# Patient Record
Sex: FEMALE | Race: WHITE | Employment: FULL TIME | URBAN - METROPOLITAN AREA
[De-identification: names, ages, dates, MRNs, and addresses within clinical notes are randomized per-mention and may not be internally consistent; named-entity substitution may affect disease eponyms.]

---

## 2017-04-17 ENCOUNTER — OFFICE VISIT (OUTPATIENT)
Dept: FAMILY MEDICINE | Facility: CLINIC | Age: 37
End: 2017-04-17
Payer: COMMERCIAL

## 2017-04-17 VITALS
WEIGHT: 200 LBS | HEIGHT: 67 IN | OXYGEN SATURATION: 99 % | SYSTOLIC BLOOD PRESSURE: 101 MMHG | DIASTOLIC BLOOD PRESSURE: 67 MMHG | BODY MASS INDEX: 31.39 KG/M2 | HEART RATE: 69 BPM

## 2017-04-17 DIAGNOSIS — B35.1 DERMATOPHYTOSIS OF NAIL: ICD-10-CM

## 2017-04-17 DIAGNOSIS — E03.4 HYPOTHYROIDISM DUE TO ACQUIRED ATROPHY OF THYROID: ICD-10-CM

## 2017-04-17 DIAGNOSIS — Z00.00 ROUTINE GENERAL MEDICAL EXAMINATION AT A HEALTH CARE FACILITY: Primary | ICD-10-CM

## 2017-04-17 LAB — TSH SERPL DL<=0.05 MIU/L-ACNC: 3.79 MU/L (ref 0.4–4)

## 2017-04-17 PROCEDURE — 87101 SKIN FUNGI CULTURE: CPT | Performed by: INTERNAL MEDICINE

## 2017-04-17 PROCEDURE — 84443 ASSAY THYROID STIM HORMONE: CPT | Performed by: INTERNAL MEDICINE

## 2017-04-17 PROCEDURE — 99385 PREV VISIT NEW AGE 18-39: CPT | Performed by: INTERNAL MEDICINE

## 2017-04-17 PROCEDURE — 36415 COLL VENOUS BLD VENIPUNCTURE: CPT | Performed by: INTERNAL MEDICINE

## 2017-04-17 NOTE — PROGRESS NOTES
SUBJECTIVE:     CC: Suzie Castillo is an 36 year old woman who presents for preventive health visit.     Patient with a history of hypothyroidism presents to the clinic to to establish care. She states that she discontinued levothyroxine and has not been taking it for a year and a half and she has notice that her headaches have returned. She also states she has associated weight gain and fatigue. She denies any changes in her voice or other symptoms of her thyroid issue.    Patient also has been treated for a toe fungus. She states that she took medication for it for 4 months in the past, but she thinks the infection has returned.    Medications:  Minocycline HCL-acne  Spironolactone  Cetirizine--Allergies  Fluticasone--Allergies      Healthy Habits:    Do you get at least three servings of calcium containing foods daily (dairy, green leafy vegetables, etc.)? yes    Amount of exercise or daily activities, outside of work: 5 day(s) per week    Problems taking medications regularly No    Medication side effects: No    Have you had an eye exam in the past two years? no    Do you see a dentist twice per year? yes    Do you have sleep apnea, excessive snoring or daytime drowsiness?no    Today's PHQ-2 Score: No flowsheet data found.    Abuse: Current or Past(Physical, Sexual or Emotional)- No  Do you feel safe in your environment - Yes    Social History   Substance Use Topics     Smoking status: Not on file     Smokeless tobacco: Not on file     Alcohol use Not on file     The patient does not drink >3 drinks per day nor >7 drinks per week.    No results for input(s): CHOL, HDL, LDL, TRIG, CHOLHDLRATIO, NHDL in the last 06761 hours.    Reviewed orders with patient.  Reviewed health maintenance and updated orders accordingly - Yes    Mammo Decision Support:  **    Pertinent mammograms are reviewed under the imaging tab.  History of abnormal Pap smear:   Reviewed and updated as needed this visit by clinical  "staff    ROS:  C: NEGATIVE for fever, chills, POSITIVE for change in weight, fatigue  I: NEGATIVE for worrisome rashes, moles or lesions, POSITIVE toe nail discoloration  E: NEGATIVE for vision changes or irritation  HENT: NEGATIVE for ear, mouth and throat , POSITIVE for headaches  R: NEGATIVE for significant cough or SOB  B: NEGATIVE for masses, tenderness or discharge  CV: NEGATIVE for chest pain, palpitations or peripheral edema  GI: NEGATIVE for nausea, abdominal pain, heartburn, or change in bowel habits  : NEGATIVE for unusual urinary or vaginal symptoms. No vaginal bleeding.  M: NEGATIVE for significant arthralgias or myalgia  N: NEGATIVE for weakness, dizziness or paresthesias  P: NEGATIVE for changes in mood or affect     Current Outpatient Prescriptions   Medication Sig Dispense Refill     Minocycline HCl (MINOCIN PO) Take by mouth 2 times daily       SPIRONOLACTONE PO Take by mouth 2 times daily       No Known Allergies     This document serves as a record of the services and decisions personally performed and made by Yusuf Doshi MD. It was created on his/her behalf by Hemal Randall, a trained medical scribe. The creation of this document is based the provider's statements to the medical scribe.  Lance Randall 8:28 AM, April 17, 2017    OBJECTIVE:     /67  Pulse 69  Ht 1.702 m (5' 7\")  Wt 90.7 kg (200 lb)  SpO2 99%  BMI 31.32 kg/m2  EXAM:    Neck was supple without adenopathy or thyromegaly or tenderness or nodularity,  her carotids were normal without bruits  Chest clear to auscultation and percussion  Cardiovascular S1 and S2 are physiologic without murmurs or gallops  Abdomen bowel sounds were normal.  There is no palpable mass or organomegaly  Extremities nontender without any edema, she onychomycosis changes of both her great nails and possibly her right third nail, samples of her nail were submitted  Skin without significant abnormality  Neuro: reflexes were zero to " "1+ bilaterally symmetrical    ASSESSMENT/PLAN:     1. Routine general medical examination at a health care facility    2. Hypothyroidism due to acquired atrophy of thyroid  - TSH    3. Dermatophytosis of nail  - Fungus Culture,  skin, hair, or nail    -Call patient with results of lab results and toe nail culture. Also check for Therapist referrals.    COUNSELING:   Reviewed preventive health counseling, as reflected in patient instructions       Regular exercise       Healthy diet/nutrition     reports that she has never smoked. She does not have any smokeless tobacco history on file.    Estimated body mass index is 31.32 kg/(m^2) as calculated from the following:    Height as of this encounter: 1.702 m (5' 7\").    Weight as of this encounter: 90.7 kg (200 lb).   Weight management plan: Discussed healthy diet and exercise guidelines and patient will follow up in 3 months in clinic to re-evaluate.    Counseling Resources:  ATP IV Guidelines  Pooled Cohorts Equation Calculator  Breast Cancer Risk Calculator  FRAX Risk Assessment  ICSI Preventive Guidelines  Dietary Guidelines for Americans, 2010  USDA's MyPlate  ASA Prophylaxis  Lung CA Screening    The information in this document, created by the medical scribe for me, accurately reflects the services I personally performed and the decisions made by me. I have reviewed and approved this document for accuracy prior to leaving the patient care area.  Yusuf Doshi MD  8:13 AM, 04/17/17    Yusuf Doshi MD  [facility]  "

## 2017-04-17 NOTE — MR AVS SNAPSHOT
After Visit Summary   4/17/2017    Suzie Castillo    MRN: [id number]           Patient Information     Date Of Birth          1980        Visit Information        Provider Department      4/17/2017 7:30 AM Yusuf Doshi MD [facility]        Today's Diagnoses     Routine general medical examination at a health care facility    -  1    Hypothyroidism due to acquired atrophy of thyroid        Dermatophytosis of nail          Care Instructions      Preventive Health Recommendations  Female Ages 26 - 39  Yearly exam:   See your health care provider every year in order to    Review health changes.     Discuss preventive care.      Review your medicines if you your doctor has prescribed any.    Until age 30: Get a Pap test every three years (more often if you have had an abnormal result).    After age 30: Talk to your doctor about whether you should have a Pap test every 3 years or have a Pap test with HPV screening every 5 years.   You do not need a Pap test if your uterus was removed (hysterectomy) and you have not had cancer.  You should be tested each year for STDs (sexually transmitted diseases), if you're at risk.   Talk to your provider about how often to have your cholesterol checked.  If you are at risk for diabetes, you should have a diabetes test (fasting glucose).  Shots: Get a flu shot each year. Get a tetanus shot every 10 years.   Nutrition:     Eat at least 5 servings of fruits and vegetables each day.    Eat whole-grain bread, whole-wheat pasta and brown rice instead of white grains and rice.    Talk to your provider about Calcium and Vitamin D.     Lifestyle    Exercise at least 150 minutes a week (30 minutes a day, 5 days of the week). This will help you control your weight and prevent disease.    Limit alcohol to one drink per day.    No smoking.     Wear sunscreen to prevent skin cancer.    See your dentist every six months for an exam and cleaning.           "Follow-ups after your visit        Who to contact     If you have questions or need follow up information about today's clinic visit or your schedule please contact [facility] directly at [phone].  Normal or non-critical lab and imaging results will be communicated to you by MyChart, letter or phone within 4 business days after the clinic has received the results. If you do not hear from us within 7 days, please contact the clinic through MyChart or phone. If you have a critical or abnormal lab result, we will notify you by phone as soon as possible.  Submit refill requests through MaxCDN or call your pharmacy and they will forward the refill request to us. Please allow 3 business days for your refill to be completed.          Additional Information About Your Visit        Shopping Buddy[employer]InSp[employer] Information     MaxCDN lets you send messages to your doctor, view your test results, renew your prescriptions, schedule appointments and more. To sign up, go to [email or web address] . Click on \"Log in\" on the left side of the screen, which will take you to the Welcome page. Then click on \"Sign up Now\" on the right side of the page.     You will be asked to enter the access code listed below, as well as some personal information. Please follow the directions to create your username and password.     Your access code is: [email or web address]  Expires: 2017  4:36 AM     Your access code will  in 90 days. If you need help or a new code, please call your Center clinic or [phone].        Care EveryWhere ID     This is your Care EveryWhere ID. This could be used by other organizations to access your Center medical records  [id number]        Your Vitals Were     Pulse Height Pulse Oximetry BMI (Body Mass Index)          69 5' 7\" (1.702 m) 99% 31.32 kg/m2         Blood Pressure from Last 3 Encounters:   17 101/67    Weight from Last 3 Encounters:   17 200 lb (90.7 kg)              We Performed the " Following     Fungus Culture,  skin, hair, or nail     TSH        Primary Care Provider    None Specified       No primary provider on file.        Thank you!     Thank you for choosing [facility]  for your care. Our goal is always to provide you with excellent care. Hearing back from our patients is one way we can continue to improve our services. Please take a few minutes to complete the written survey that you may receive in the mail after your visit with us. Thank you!             Your Updated Medication List - Protect others around you: Learn how to safely use, store and throw away your medicines at www.disposemymeds.org.          This list is accurate as of: 4/17/17 11:59 PM.  Always use your most recent med list.                   Brand Name Dispense Instructions for use    MINOCIN PO      Take by mouth 2 times daily       SPIRONOLACTONE PO      Take by mouth 2 times daily

## 2017-04-25 ENCOUNTER — TELEPHONE (OUTPATIENT)
Dept: FAMILY MEDICINE | Facility: CLINIC | Age: 37
End: 2017-04-25

## 2017-04-25 NOTE — TELEPHONE ENCOUNTER
Reason for Call:  Request for results:    Name of test or procedure: Labs    Date of test of procedure: 4/17/2016    Location of the test or procedure: [facility]    OK to leave the result message on voice mail or with a family member? NO    Phone number Patient can be reached at:  Home number on file [phone] (home)    Additional comments: Please mail to her Home address results were read    Call taken on 4/25/2017 at 11:06 AM by Anel Rg

## 2017-04-25 NOTE — TELEPHONE ENCOUNTER
Reason for Call:  Other Referral    Detailed comments: The patient wants a Referral for a Family Therapist  Delores Gudino    Phone Number Patient can be reached at: Home number on file [phone] (home)    Best Time: anytime    Can we leave a detailed message on this number? YES    Call taken on 4/25/2017 at 11:17 AM by Anel Rg

## 2017-05-11 ENCOUNTER — TELEPHONE (OUTPATIENT)
Dept: FAMILY MEDICINE | Facility: CLINIC | Age: 37
End: 2017-05-11

## 2017-05-11 NOTE — TELEPHONE ENCOUNTER
Reason for Call:  Other results    Detailed comments: patient would like to get lab results from April, and also said she was to get referral for therapist so she wants to get that info to call and schedule.     Phone Number Patient can be reached at: Cell number on file:    Telephone Information:   Mobile [phone]       Best Time: any    Can we leave a detailed message on this number? YES    Call taken on 5/11/2017 at 12:45 PM by Tere Olvera

## 2017-05-15 LAB
BACTERIA SPEC CULT: NORMAL
MICRO REPORT STATUS: NORMAL
SPECIMEN SOURCE: NORMAL

## 2017-05-23 ENCOUNTER — OFFICE VISIT (OUTPATIENT)
Dept: FAMILY MEDICINE | Facility: CLINIC | Age: 37
End: 2017-05-23
Payer: COMMERCIAL

## 2017-05-23 VITALS
WEIGHT: 201 LBS | DIASTOLIC BLOOD PRESSURE: 93 MMHG | HEIGHT: 67 IN | TEMPERATURE: 97.6 F | SYSTOLIC BLOOD PRESSURE: 139 MMHG | BODY MASS INDEX: 31.55 KG/M2 | HEART RATE: 83 BPM | OXYGEN SATURATION: 100 %

## 2017-05-23 DIAGNOSIS — F41.8 DEPRESSION WITH ANXIETY: Primary | ICD-10-CM

## 2017-05-23 PROCEDURE — 99214 OFFICE O/P EST MOD 30 MIN: CPT | Performed by: NURSE PRACTITIONER

## 2017-05-23 RX ORDER — CITALOPRAM HYDROBROMIDE 20 MG/1
TABLET ORAL
Qty: 30 TABLET | Refills: 0 | Status: SHIPPED | OUTPATIENT
Start: 2017-05-23 | End: 2017-06-13

## 2017-05-23 RX ORDER — LORAZEPAM 0.5 MG/1
.5-1 TABLET ORAL 2 TIMES DAILY PRN
Qty: 20 TABLET | Refills: 0 | Status: SHIPPED | OUTPATIENT
Start: 2017-05-23 | End: 2017-06-16

## 2017-05-23 ASSESSMENT — ANXIETY QUESTIONNAIRES
3. WORRYING TOO MUCH ABOUT DIFFERENT THINGS: NEARLY EVERY DAY
7. FEELING AFRAID AS IF SOMETHING AWFUL MIGHT HAPPEN: MORE THAN HALF THE DAYS
1. FEELING NERVOUS, ANXIOUS, OR ON EDGE: NEARLY EVERY DAY
GAD7 TOTAL SCORE: 18
6. BECOMING EASILY ANNOYED OR IRRITABLE: MORE THAN HALF THE DAYS
5. BEING SO RESTLESS THAT IT IS HARD TO SIT STILL: NEARLY EVERY DAY
2. NOT BEING ABLE TO STOP OR CONTROL WORRYING: MORE THAN HALF THE DAYS
IF YOU CHECKED OFF ANY PROBLEMS ON THIS QUESTIONNAIRE, HOW DIFFICULT HAVE THESE PROBLEMS MADE IT FOR YOU TO DO YOUR WORK, TAKE CARE OF THINGS AT HOME, OR GET ALONG WITH OTHER PEOPLE: SOMEWHAT DIFFICULT

## 2017-05-23 ASSESSMENT — PATIENT HEALTH QUESTIONNAIRE - PHQ9: 5. POOR APPETITE OR OVEREATING: NEARLY EVERY DAY

## 2017-05-23 NOTE — PATIENT INSTRUCTIONS
We will start a daily antidepressant pill, citalopram. We will start with half of a pill for 1-2 weeks, then you can increase to a full pill. Increase when you feel that you do not have any side effects (an example is nausea).   I also gave you lorazepam, which is a rescue antianxiety medication. Start by taking 1 pill. If it is too strong, you can cut the pill in half. If you are having a really bad anxiety attack, you can take 2.   Please follow up with Dr Doshi in 3-4 weeks to make sure you are doing well

## 2017-05-23 NOTE — TELEPHONE ENCOUNTER
"PCP:    Spoke with the Pt. The Pt reports that she has been having a \"consistant panic attack for the past 2 months.\"  Pt states that she was at work today, and started to have uncontrollable anxiety - crying fits, shaking and feeling panicky.   Pt is currently not on any psychiatric medications. Denies any psychiatric hx. States she just recently broke up with her fiance. This has been a major stressor in her life.   Pt is currently in her car 5 minutes from the clinic. Pt denies any thoughts of harming herself or others.   I did schedule the Pt with TEAM at 4:30pm, Pt states she can make it here in time for this appointment.     Aria Cornell RN             "

## 2017-05-23 NOTE — TELEPHONE ENCOUNTER
Reason for Call: Following up on results     Detailed comments: Suzie Castillo is calling because she has been waiting for a call since May 11, 2017 and no one has answered her. Please give her a call back immediately.     Phone Number Patient can be reached at: Home number on file [phone] (home)    Best Time: ASAP     Can we leave a detailed message on this number? YES    Call taken on 5/23/2017 at 4:14 PM by Audelia Silva

## 2017-05-23 NOTE — NURSING NOTE
"Chief Complaint   Patient presents with     Anxiety       Initial BP (!) 139/93 (BP Location: Left arm, Patient Position: Chair, Cuff Size: Adult Regular)  Pulse 83  Temp 97.6  F (36.4  C)  Ht 5' 7\" (1.702 m)  Wt 201 lb (91.2 kg)  SpO2 100%  Breastfeeding? No  BMI 31.48 kg/m2 Estimated body mass index is 31.48 kg/(m^2) as calculated from the following:    Height as of this encounter: 5' 7\" (1.702 m).    Weight as of this encounter: 201 lb (91.2 kg).  Medication Reconciliation: incomplete   Having active anxiety attack  "

## 2017-05-23 NOTE — PROGRESS NOTES
"HPI    New anxiety symptoms for about 5 weeks when she broke up with fiance. She just found out he got  last weekend which made all her symptoms worse.  Was on an antidepressant 10 years ago   Started waking in the middle of the night with tears nearly every night   Using lavender and chamomile   Poor appetite but still eating   Poor concentration   Also irritable   Mild palpitations but more shaking   No SI/HI   Works as a    Lives alone in an apartment   Has a good support system       No past medical history on file.  No past surgical history on file.  Social History   Substance Use Topics     Smoking status: Never Smoker     Smokeless tobacco: Never Used     Alcohol use Yes      Comment: rare social      Current Outpatient Prescriptions   Medication Sig Dispense Refill     citalopram (CELEXA) 20 MG tablet Take 1/2 tablet (10 mg) for 1-2 weeks, then increase to 1 tablet orally daily 30 tablet 0     LORazepam (ATIVAN) 0.5 MG tablet Take 1-2 tablets (0.5-1 mg) by mouth 2 times daily as needed for anxiety 20 tablet 0     Minocycline HCl (MINOCIN PO) Take by mouth 2 times daily       SPIRONOLACTONE PO Take by mouth 2 times daily       No Known Allergies    Reviewed PMH, med list and allergies.        ROS  Detailed as above       BP (!) 139/93 (BP Location: Left arm, Patient Position: Chair, Cuff Size: Adult Regular)  Pulse 83  Temp 97.6  F (36.4  C)  Ht 5' 7\" (1.702 m)  Wt 201 lb (91.2 kg)  SpO2 100%  BMI 31.48 kg/m2      Physical Exam   Constitutional: She is well-developed, well-nourished, and in no distress.   HENT:   Head: Normocephalic.   Eyes: Conjunctivae are normal.   Pulmonary/Chest: Effort normal.   Neurological: She is alert.   Psychiatric: Affect normal. Her mood appears anxious.   Crying, shaking, anxious   Vitals reviewed.      Assessment and Plan:       ICD-10-CM    1. Depression with anxiety F41.8 citalopram (CELEXA) 20 MG tablet     LORazepam (ATIVAN) 0.5 MG tablet "       I would recommend counseling at her next visit. This was mentioned, but she was in such crisis, that we didn't discuss much. We will start with treatment per below and close f/u.     Patient Instructions   We will start a daily antidepressant pill, citalopram. We will start with half of a pill for 1-2 weeks, then you can increase to a full pill. Increase when you feel that you do not have any side effects (an example is nausea).   I also gave you lorazepam, which is a rescue antianxiety medication. Start by taking 1 pill. If it is too strong, you can cut the pill in half. If you are having a really bad anxiety attack, you can take 2.   Please follow up with Dr Doshi in 3-4 weeks to make sure you are doing well         The total visit time was 20 minutes more  than 50% was spent in counseling and coordination of care as discussed above.      Hazel Lunsford, GENE, CNP  [facility]

## 2017-05-23 NOTE — PROGRESS NOTES
"HPI      SUBJECTIVE:                                                    Suzie Castillo is a 36 year old female who presents to clinic today for the following health issues:      Abnormal Mood Symptoms      Duration: ***    Description:  Depression: { :203143::\"no\"}  Anxiety: { :407702::\"no\"}  Panic attacks: { :853106::\"no\"}     Accompanying signs and symptoms: see PHQ-9 and TWYLA scores    History (similar episodes/previous evaluation): {.:101658::\"None\"}    Precipitating or alleviating factors: {.:390911::\"None\"}    Therapies tried and outcome: {.:292440::\"none\"}       {additional problems for provider to add:677728}    Problem list and histories reviewed & adjusted, as indicated.  Additional history: {NONE - AS DOCUMENTED:465153::\"as documented\"}    {HIST REVIEW/ LINKS 2:086324}    Reviewed and updated as needed this visit by clinical staff       Reviewed and updated as needed this visit by Provider         {PROVIDER CHARTING PREFERENCE:918115}      ROS      Physical Exam      "

## 2017-05-23 NOTE — MR AVS SNAPSHOT
After Visit Summary   5/23/2017    Suzie Castillo    MRN: [id number]           Patient Information     Date Of Birth          1980        Visit Information        Provider Department      5/23/2017 4:30 PM Hazel Lunsford APRN CNP [facility]        Today's Diagnoses     Depression with anxiety    -  1      Care Instructions    We will start a daily antidepressant pill, citalopram. We will start with half of a pill for 1-2 weeks, then you can increase to a full pill. Increase when you feel that you do not have any side effects (an example is nausea).   I also gave you lorazepam, which is a rescue antianxiety medication. Start by taking 1 pill. If it is too strong, you can cut the pill in half. If you are having a really bad anxiety attack, you can take 2.   Please follow up with Dr Doshi in 3-4 weeks to make sure you are doing well           Follow-ups after your visit        Your next 10 appointments already scheduled     Jun 19, 2017 11:00 AM CDT   New Visit with Katharina Reyez, [facility] [place] ([facility])    [address]  [facility] [address] [phone]            Jun 29, 2017  3:00 PM CDT   Return Visit with Katharina Reyez [facility] ([facility])    [address]  [facility] [address] [phone]              Who to contact     If you have questions or need follow up information about today's clinic visit or your schedule please contact [facility] directly at [phone].  Normal or non-critical lab and imaging results will be communicated to you by MyChart, letter or phone within 4 business days after the clinic has received the results. If you do not hear from us within 7 days, please contact the clinic through MyChart or phone. If you have a critical or abnormal lab result, we will notify you by phone as soon as possible.  Submit refill requests through [employer] or  "call your pharmacy and they will forward the refill request to us. Please allow 3 business days for your refill to be completed.          Additional Information About Your Visit        MyChart Information     Adifyhart lets you send messages to your doctor, view your test results, renew your prescriptions, schedule appointments and more. To sign up, go to [email or web address] . Click on \"Log in\" on the left side of the screen, which will take you to the Welcome page. Then click on \"Sign up Now\" on the right side of the page.     You will be asked to enter the access code listed below, as well as some personal information. Please follow the directions to create your username and password.     Your access code is: [email or web address]  Expires: 2017  4:36 AM     Your access code will  in 90 days. If you need help or a new code, please call your [facility] clinic or [phone].        Care EveryWhere ID     This is your Care EveryWhere ID. This could be used by other organizations to access your [facility] medical records  [id number]        Your Vitals Were     Pulse Temperature Height Pulse Oximetry BMI (Body Mass Index)       83 97.6  F (36.4  C) 5' 7\" (1.702 m) 100% 31.48 kg/m2        Blood Pressure from Last 3 Encounters:   17 (!) 139/93   17 101/67    Weight from Last 3 Encounters:   17 201 lb (91.2 kg)   17 200 lb (90.7 kg)              Today, you had the following     No orders found for display         Today's Medication Changes          These changes are accurate as of: 17  5:04 PM.  If you have any questions, ask your nurse or doctor.               Start taking these medicines.        Dose/Directions    citalopram 20 MG tablet   Commonly known as:  celeXA   Used for:  Depression with anxiety        Take 1/2 tablet (10 mg) for 1-2 weeks, then increase to 1 tablet orally daily   Quantity:  30 tablet   Refills:  0       LORazepam 0.5 MG tablet   Commonly known as:  ATIVAN   Used " for:  Depression with anxiety        Dose:  0.5-1 mg   Take 1-2 tablets (0.5-1 mg) by mouth 2 times daily as needed for anxiety   Quantity:  20 tablet   Refills:  0            Where to get your medicines      Some of these will need a paper prescription and others can be bought over the counter.  Ask your nurse if you have questions.     Bring a paper prescription for each of these medications     citalopram 20 MG tablet    LORazepam 0.5 MG tablet                Primary Care Provider    None Specified       No primary provider on file.        Thank you!     Thank you for choosing [facility]  for your care. Our goal is always to provide you with excellent care. Hearing back from our patients is one way we can continue to improve our services. Please take a few minutes to complete the written survey that you may receive in the mail after your visit with us. Thank you!             Your Updated Medication List - Protect others around you: Learn how to safely use, store and throw away your medicines at www.disposemymeds.org.          This list is accurate as of: 5/23/17  5:04 PM.  Always use your most recent med list.                   Brand Name Dispense Instructions for use    citalopram 20 MG tablet    celeXA    30 tablet    Take 1/2 tablet (10 mg) for 1-2 weeks, then increase to 1 tablet orally daily       LORazepam 0.5 MG tablet    ATIVAN    20 tablet    Take 1-2 tablets (0.5-1 mg) by mouth 2 times daily as needed for anxiety       MINOCIN PO      Take by mouth 2 times daily       SPIRONOLACTONE PO      Take by mouth 2 times daily

## 2017-05-24 ASSESSMENT — PATIENT HEALTH QUESTIONNAIRE - PHQ9: SUM OF ALL RESPONSES TO PHQ QUESTIONS 1-9: 20

## 2017-05-24 ASSESSMENT — ANXIETY QUESTIONNAIRES: GAD7 TOTAL SCORE: 18

## 2017-05-25 ENCOUNTER — OFFICE VISIT (OUTPATIENT)
Dept: PSYCHOLOGY | Facility: CLINIC | Age: 37
End: 2017-05-25
Payer: COMMERCIAL

## 2017-05-25 DIAGNOSIS — F43.23 ADJUSTMENT DISORDER WITH MIXED ANXIETY AND DEPRESSED MOOD: Primary | ICD-10-CM

## 2017-05-25 PROCEDURE — 90791 PSYCH DIAGNOSTIC EVALUATION: CPT | Performed by: SOCIAL WORKER

## 2017-05-25 ASSESSMENT — ANXIETY QUESTIONNAIRES
5. BEING SO RESTLESS THAT IT IS HARD TO SIT STILL: MORE THAN HALF THE DAYS
2. NOT BEING ABLE TO STOP OR CONTROL WORRYING: NEARLY EVERY DAY
7. FEELING AFRAID AS IF SOMETHING AWFUL MIGHT HAPPEN: MORE THAN HALF THE DAYS
GAD7 TOTAL SCORE: 17
1. FEELING NERVOUS, ANXIOUS, OR ON EDGE: NEARLY EVERY DAY
IF YOU CHECKED OFF ANY PROBLEMS ON THIS QUESTIONNAIRE, HOW DIFFICULT HAVE THESE PROBLEMS MADE IT FOR YOU TO DO YOUR WORK, TAKE CARE OF THINGS AT HOME, OR GET ALONG WITH OTHER PEOPLE: SOMEWHAT DIFFICULT
3. WORRYING TOO MUCH ABOUT DIFFERENT THINGS: MORE THAN HALF THE DAYS
6. BECOMING EASILY ANNOYED OR IRRITABLE: MORE THAN HALF THE DAYS

## 2017-05-25 ASSESSMENT — PATIENT HEALTH QUESTIONNAIRE - PHQ9: 5. POOR APPETITE OR OVEREATING: NEARLY EVERY DAY

## 2017-05-25 NOTE — MR AVS SNAPSHOT
"                  MRN:[id number]                      After Visit Summary   2017    Suzie Castillo    MRN: [id number]           Visit Information        Provider Department      2017 3:00 PM Katharina Reyez [facility] Generic      Your next 10 appointments already scheduled     2017  5:00 PM CDT   Return Visit with Katharina Reyez [facility] ([facility])    [address] [place] [address] [address]  [facility] [address]   [phone]            2017  5:00 PM CDT   Office Visit with Yusuf Doshi MD   [facility] ([facility])    [address] [facility] [address] [phone]           Bring a current list of meds and any records pertaining to this visit.  For Physicals, please bring immunization records and any forms needing to be filled out.  Please arrive 10 minutes early to complete paperwork.            2017 11:00 AM CDT   Return Visit with Katharina Reyez [facility] ([facility])    [address] [address]  [facility] [address]   [phone]            2017  3:00 PM CDT   Return Visit with Katharina Reyez [facility] ([facility])    [address] [place] [address] [address]  [facility] [address]   [phone]              MyChart Information     [employer] lets you send messages to your doctor, view your test results, renew your prescriptions, schedule appointments and more. To sign up, go to [email or web address] . Click on \"Log in\" on the left side of the screen, which will take you to the Welcome page. Then click on \"Sign up Now\" on the right side of the page.     You will be asked to enter the access code listed below, as well as some personal information. Please follow the directions to create your username and password.     Your access code is: [email or web address]  Expires: 2017  4:36 AM     Your access code will  in 90 days. " If you need help or a new code, please call your [place] clinic or [phone].        Care EveryWhere ID     This is your Care EveryWhere ID. This could be used by other organizations to access your [place] medical records  [id number]

## 2017-05-25 NOTE — PROGRESS NOTES
Progress Note - Initial Session    Client Name:  Suzie Castillo Date: 5/25/2017           Service Type: Individual      Session Start Time: 4pm  Session End Time: 445      Session Length: 38 - 52      Session #: 1     Attendees: Client attended alone         Diagnostic Assessment in progress.  Unable to complete documentation at the conclusion of the first session due to time limitations.      Mental Status Assessment:  Appearance:   Appropriate   Eye Contact:   Good   Psychomotor Behavior: Normal   Attitude:   Cooperative   Orientation:   All  Speech   Rate / Production: Normal    Volume:  Normal   Mood:    Anxious  Depressed  Sad   Affect:    Appropriate   Thought Content:  Clear  Rumination   Thought Form:  Coherent  Logical   Insight:    Good       Safety Issues and Plan for Safety and Risk Management:  Client denies current fears or concerns for personal safety.  Client denies current or recent suicidal ideation or behaviors.  Client denies current or recent homicidal ideation or behaviors.  Client denies current or recent self injurious behavior or ideation.  Client denies other safety concerns.  A safety and risk management plan has not been developed at this time, however client was given the after-hours number / 911 should there be a change in any of these risk factors.  Client reports there are no firearms in the house.      Diagnostic Criteria:  A. The development of emotional or behavioral symptoms in response to an identifiable stressor(s) occurring within 3 months of the onset of the stressor(s)  B. These symptoms or behaviors are clinically significant, as evidenced by one or both of the following:  C. The stress-related disturbance does not meet criteria for another disorder & is not not an exacerbation of another mental disorder  D. The symptoms do not represent normal bereavement  E. Once the stressor or its consequences have terminated, the symptoms do not persist for more  than an additional 6 months        DSM5 Diagnoses: (Sustained by DSM5 Criteria Listed Above)  Diagnoses: Adjustment Disorders  309.28 (F43.23) With mixed anxiety and depressed mood  Psychosocial & Contextual Factors: Sudden loss; relational difficulties.  WHODAS 2.0 (12 item)            This questionnaire asks about difficulties due to health conditions. Health conditions  include  disease or illnesses, other health problems that may be short or long lasting,  injuries, mental health or emotional problems, and problems with alcohol or drugs.                     Think back over the past 30 days and answer these questions, thinking about how much  difficulty you had doing the following activities. For each question, please [place] only  one response.    S1 Standing for long periods such as 30 minutes? None =         1   S2 Taking care of household responsibilities? Mild =           2   S3 Learning a new task, for example, learning how to get to a new place? Mild =           2   S4 How much of a problem do you have joining community activities (for example, festivals, Judaism or other activities) in the same way as anyone else can? Mild =           2   S5 How much have you been emotionally affected by your health problems? Mild =           2     In the past 30 days, how much difficulty did you have in:   S6 Concentrating on doing something for ten minutes? Mild =           2   S7 Walking a long distance such as a kilometer (or equivalent)? Mild =           2   S8 Washing your whole body? None =         1   S9 Getting dressed? None =         1   S10 Dealing with people you do not know? None =         1   S11 Maintaining a friendship? None =         1   S12 Your day to day work? Moderate =   3     H1 Overall, in the past 30 days, how many days were these difficulties present? Record number of days 14   H2 In the past 30 days, for how many days were you totally unable to carry out your usual activities or work because of  any health condition? Record number of days  2   H3 In the past 30 days, not counting the days that you were totally unable, for how many days did you cut back or reduce your usual activities or work because of any health condition? Record number of days 7       Collateral Reports Completed:  Routed note to PCP      PLAN: (Homework, other):  Client stated that she may follow up for ongoing services with [facility].  Rescheduling future appnts. Returns to clinic 6/7/17.  Recently began an anti depressant and atavan.  Full DA to follow.      MIS Reid

## 2017-05-26 ASSESSMENT — ANXIETY QUESTIONNAIRES: GAD7 TOTAL SCORE: 17

## 2017-05-26 ASSESSMENT — PATIENT HEALTH QUESTIONNAIRE - PHQ9: SUM OF ALL RESPONSES TO PHQ QUESTIONS 1-9: 16

## 2017-06-07 ENCOUNTER — OFFICE VISIT (OUTPATIENT)
Dept: PSYCHOLOGY | Facility: CLINIC | Age: 37
End: 2017-06-07
Payer: COMMERCIAL

## 2017-06-07 DIAGNOSIS — F43.23 ADJUSTMENT DISORDER WITH MIXED ANXIETY AND DEPRESSED MOOD: Primary | ICD-10-CM

## 2017-06-07 PROCEDURE — 90834 PSYTX W PT 45 MINUTES: CPT | Performed by: SOCIAL WORKER

## 2017-06-07 ASSESSMENT — ANXIETY QUESTIONNAIRES
1. FEELING NERVOUS, ANXIOUS, OR ON EDGE: NEARLY EVERY DAY
3. WORRYING TOO MUCH ABOUT DIFFERENT THINGS: MORE THAN HALF THE DAYS
2. NOT BEING ABLE TO STOP OR CONTROL WORRYING: MORE THAN HALF THE DAYS
5. BEING SO RESTLESS THAT IT IS HARD TO SIT STILL: SEVERAL DAYS
7. FEELING AFRAID AS IF SOMETHING AWFUL MIGHT HAPPEN: SEVERAL DAYS
GAD7 TOTAL SCORE: 14
IF YOU CHECKED OFF ANY PROBLEMS ON THIS QUESTIONNAIRE, HOW DIFFICULT HAVE THESE PROBLEMS MADE IT FOR YOU TO DO YOUR WORK, TAKE CARE OF THINGS AT HOME, OR GET ALONG WITH OTHER PEOPLE: SOMEWHAT DIFFICULT
6. BECOMING EASILY ANNOYED OR IRRITABLE: MORE THAN HALF THE DAYS

## 2017-06-07 ASSESSMENT — PATIENT HEALTH QUESTIONNAIRE - PHQ9: 5. POOR APPETITE OR OVEREATING: NEARLY EVERY DAY

## 2017-06-07 NOTE — MR AVS SNAPSHOT
"                  MRN:[id number]                      After Visit Summary   2017    Suzie Castillo    MRN: [id number]           Visit Information        Provider Department      2017 5:00 PM Dereje Katharina [facility] Generic      Your next 10 appointments already scheduled     2017  5:00 PM CDT   Office Visit with Yusuf Doshi MD   [facility]    [address] [facility] [address]   [phone]           Bring a current list of meds and any records pertaining to this visit.  For Physicals, please bring immunization records and any forms needing to be filled out.  Please arrive 10 minutes early to complete paperwork.            2017 11:00 AM CDT   Return Visit with Katharina Perimelonie [facility] ([facility])    [address] [place] [address] [address]  [facility] [address]   [phone]            2017  3:00 PM CDT   Return Visit with Katharina Reyez [facility] ([facility])    [address]  [facility] [address]   [phone]              MyChart Information     [employer] lets you send messages to your doctor, view your test results, renew your prescriptions, schedule appointments and more. To sign up, go to [email or web address] . Click on \"Log in\" on the left side of the screen, which will take you to the Welcome page. Then click on \"Sign up Now\" on the right side of the page.     You will be asked to enter the access code listed below, as well as some personal information. Please follow the directions to create your username and password.     Your access code is: [email or web address]  Expires: 2017  4:36 AM     Your access code will  in 90 days. If you need help or a new code, please call your [place] clinic or [phone].        Care EveryWhere ID     This is your Care EveryWhere ID. This could be used by other organizations to " access your [facility] records  [id number]
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
## 2017-06-08 ASSESSMENT — ANXIETY QUESTIONNAIRES: GAD7 TOTAL SCORE: 14

## 2017-06-08 ASSESSMENT — PATIENT HEALTH QUESTIONNAIRE - PHQ9: SUM OF ALL RESPONSES TO PHQ QUESTIONS 1-9: 11
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
## 2017-06-10 ENCOUNTER — HEALTH MAINTENANCE LETTER (OUTPATIENT)
Age: 37
End: 2017-06-10

## 2017-06-13 ENCOUNTER — OFFICE VISIT (OUTPATIENT)
Dept: FAMILY MEDICINE | Facility: CLINIC | Age: 37
End: 2017-06-13
Payer: COMMERCIAL

## 2017-06-13 ENCOUNTER — TELEPHONE (OUTPATIENT)
Dept: FAMILY MEDICINE | Facility: CLINIC | Age: 37
End: 2017-06-13

## 2017-06-13 VITALS
TEMPERATURE: 98.6 F | SYSTOLIC BLOOD PRESSURE: 101 MMHG | WEIGHT: 199.8 LBS | HEART RATE: 63 BPM | BODY MASS INDEX: 31.36 KG/M2 | OXYGEN SATURATION: 97 % | DIASTOLIC BLOOD PRESSURE: 66 MMHG | HEIGHT: 67 IN

## 2017-06-13 DIAGNOSIS — Z23 NEED FOR PROPHYLACTIC VACCINATION WITH TETANUS-DIPHTHERIA (TD): Primary | ICD-10-CM

## 2017-06-13 DIAGNOSIS — R03.0 ELEVATED BLOOD PRESSURE READING WITHOUT DIAGNOSIS OF HYPERTENSION: ICD-10-CM

## 2017-06-13 DIAGNOSIS — F43.23 ADJUSTMENT DISORDER WITH MIXED ANXIETY AND DEPRESSED MOOD: ICD-10-CM

## 2017-06-13 DIAGNOSIS — F41.8 DEPRESSION WITH ANXIETY: ICD-10-CM

## 2017-06-13 PROCEDURE — 99213 OFFICE O/P EST LOW 20 MIN: CPT | Performed by: INTERNAL MEDICINE

## 2017-06-13 RX ORDER — CITALOPRAM HYDROBROMIDE 40 MG/1
TABLET ORAL
Qty: 30 TABLET | Refills: 1 | Status: SHIPPED | OUTPATIENT
Start: 2017-06-13 | End: 2017-06-14

## 2017-06-13 NOTE — NURSING NOTE
"Chief Complaint   Patient presents with     Anxiety       Initial /66 (BP Location: Right arm, Patient Position: Chair, Cuff Size: Adult Large)  Pulse 63  Temp 98.6  F (37  C) (Oral)  Ht 5' 7\" (1.702 m)  Wt 199 lb 12.8 oz (90.6 kg)  SpO2 97%  Breastfeeding? No  BMI 31.29 kg/m2 Estimated body mass index is 31.29 kg/(m^2) as calculated from the following:    Height as of this encounter: 5' 7\" (1.702 m).    Weight as of this encounter: 199 lb 12.8 oz (90.6 kg).  Medication Reconciliation: complete     Aria Valenzuela MA     "

## 2017-06-13 NOTE — MR AVS SNAPSHOT
After Visit Summary   6/13/2017    Suzie Castillo    MRN: [id number]           Patient Information     Date Of Birth          1980        Visit Information        Provider Department      6/13/2017 5:00 PM Yusuf Doshi MD [facility]        Today's Diagnoses     Need for prophylactic vaccination with tetanus-diphtheria (TD)    -  1    Adjustment disorder with mixed anxiety and depressed mood        Elevated blood pressure reading without diagnosis of hypertension        Depression with anxiety           Follow-ups after your visit        Your next 10 appointments already scheduled     Jun 19, 2017 11:00 AM CDT   Return Visit with Katharina Reyez, [facility] [place] ([facility] [place])    [address]  [place] [address] [phone]            Jun 29, 2017  3:00 PM CDT   Return Visit with Katharina Reyez, [facility] [place] ([facility] [place])    [address]  [place] [address] [phone]              Who to contact     If you have questions or need follow up information about today's clinic visit or your schedule please contact [facility] directly at [phone].  Normal or non-critical lab and imaging results will be communicated to you by MyChart, letter or phone within 4 business days after the clinic has received the results. If you do not hear from us within 7 days, please contact the clinic through MyChart or phone. If you have a critical or abnormal lab result, we will notify you by phone as soon as possible.  Submit refill requests through [email or web address] or call your pharmacy and they will forward the refill request to us. Please allow 3 business days for your refill to be completed.          Additional Information About Your Visit        [employer] Information     [email or web address] lets you send messages to your doctor, view your test results, renew your prescriptions, schedule appointments and more. To sign  "up, go to [email or web address] . Click on \"Log in\" on the left side of the screen, which will take you to the Welcome page. Then click on \"Sign up Now\" on the right side of the page.     You will be asked to enter the access code listed below, as well as some personal information. Please follow the directions to create your username and password.     Your access code is: [email or web address]  Expires: 2017  4:36 AM     Your access code will  in 90 days. If you need help or a new code, please call your [place] clinic or [phone].        Care EveryWhere ID     This is your Care EveryWhere ID. This could be used by other organizations to access your [place] medical records  [id number]        Your Vitals Were     Pulse Temperature Height Pulse Oximetry Breastfeeding? BMI (Body Mass Index)    63 98.6  F (37  C) (Oral) 5' 7\" (1.702 m) 97% No 31.29 kg/m2       Blood Pressure from Last 3 Encounters:   17 101/66   17 (!) 139/93   17 101/67    Weight from Last 3 Encounters:   17 199 lb 12.8 oz (90.6 kg)   17 201 lb (91.2 kg)   17 200 lb (90.7 kg)              Today, you had the following     No orders found for display         Today's Medication Changes          These changes are accurate as of: 17 11:59 PM.  If you have any questions, ask your nurse or doctor.               These medicines have changed or have updated prescriptions.        Dose/Directions    citalopram 40 MG tablet   Commonly known as:  celeXA   This may have changed:  medication strength   Used for:  Depression with anxiety   Changed by:  Yusuf Doshi MD        Take 1/2 tablet (10 mg) for 1-2 weeks, then increase to 1 tablet orally daily   Quantity:  30 tablet   Refills:  1            Where to get your medicines      These medications were sent to [facility] [id number] [place] [address] [address] AT [facility] & [address] [place] [address] [facility]A FONTANE[facility] [address]     Phone:  " [phone]     citalopram 40 MG tablet                Primary Care Provider Office Phone # Fax #    Yusuf Doshi [phone]       [facility] [address] [facility] [address]  [facility] [address]        Thank you!     Thank you for choosing [facility]  for your care. Our goal is always to provide you with excellent care. Hearing back from our patients is one way we can continue to improve our services. Please take a few minutes to complete the written survey that you may receive in the mail after your visit with us. Thank you!             Your Updated Medication List - Protect others around you: Learn how to safely use, store and throw away your medicines at www.disposemymeds.org.          This list is accurate as of: 6/13/17 11:59 PM.  Always use your most recent med list.                   Brand Name Dispense Instructions for use    citalopram 40 MG tablet    celeXA    30 tablet    Take 1/2 tablet (10 mg) for 1-2 weeks, then increase to 1 tablet orally daily       LORazepam 0.5 MG tablet    ATIVAN    20 tablet    Take 1-2 tablets (0.5-1 mg) by mouth 2 times daily as needed for anxiety       MINOCIN PO      Take by mouth 2 times daily       SPIRONOLACTONE PO      Take by mouth 2 times daily

## 2017-06-13 NOTE — TELEPHONE ENCOUNTER
Kathie Power needs clarification on a Rx that was just sent  Over. She said the directions from Dr. Doshi do not match  The Rx strength

## 2017-06-14 RX ORDER — CITALOPRAM HYDROBROMIDE 40 MG/1
TABLET ORAL
Qty: 30 TABLET | Refills: 1 | Status: SHIPPED | OUTPATIENT
Start: 2017-06-14 | End: 2017-08-22

## 2017-06-14 NOTE — TELEPHONE ENCOUNTER
"PCP:    Please review Celexa script. Looks like its for 40 mg tabs, with the sig advising to take \"1/2 tablet, 10 mg\". Do you mean take 1/2 tab - 20 mg? Please advise.     citalopram (CELEXA) 40 MG tablet 30 tablet 1 6/13/2017  No      Sig: Take 1/2 tablet (10 mg) for 1-2 weeks, then increase to 1 tablet orally daily     Aria Cornell RN     "

## 2017-06-16 DIAGNOSIS — F41.8 DEPRESSION WITH ANXIETY: ICD-10-CM

## 2017-06-16 RX ORDER — LORAZEPAM 0.5 MG/1
TABLET ORAL
Qty: 20 TABLET | Refills: 0 | Status: SHIPPED | OUTPATIENT
Start: 2017-06-16 | End: 2017-07-24

## 2017-06-16 NOTE — TELEPHONE ENCOUNTER
LORazepam (ATIVAN) 0.5 MG tablet 20 tablet 0 5/23/2017          Last Written Prescription Date: 05/23/2017  Last Fill Quantity: 20, # refills: 0  [facility] Office Visit with [facility] provider:  06/13/2017   Next 5 appointments (look out 90 days)     Jun 29, 2017  3:00 PM CDT   Return Visit with Katharina Reyez, [facility] [place] ([facility] [place])    [address]  [place] [address]   [phone]            Jul 18, 2017 11:00 AM CDT   Return Visit with Katharina Reyez, [facility] [place] ([facility] [place])    [address]  [place] [address]   [phone]                   Last PHQ-9 score on record=   PHQ-9 SCORE 6/7/2017   Total Score 11

## 2017-06-29 ENCOUNTER — OFFICE VISIT (OUTPATIENT)
Dept: PSYCHOLOGY | Facility: CLINIC | Age: 37
End: 2017-06-29
Payer: COMMERCIAL

## 2017-06-29 DIAGNOSIS — F43.23 ADJUSTMENT DISORDER WITH MIXED ANXIETY AND DEPRESSED MOOD: Primary | ICD-10-CM

## 2017-06-29 PROCEDURE — 90834 PSYTX W PT 45 MINUTES: CPT | Performed by: SOCIAL WORKER

## 2017-06-29 NOTE — MR AVS SNAPSHOT
"                  MRN:[id number]                      After Visit Summary   2017    Suzie Castillo    MRN: [id number]           Visit Information        Provider Department      2017 3:00 PM Katharina Reyez, [facility] [place] [facility]      Your next 10 appointments already scheduled     2017 11:00 AM CDT   Return Visit with Katharina Reyez [facility] [place] ([facility] [place])    [address]  [place] [address]   [phone]            2017 12:30 PM CDT   Return Visit with Katharina Reyez [facility] [place] ([facility] [place])    [address]  [place] [address]   [phone]            Aug 09, 2017  4:00 PM CDT   Return Visit with Katharina Reyez [facility] [place] ([facility] [place])    [address]  [place] [address]   [phone]              MyChart Information     [employer] lets you send messages to your doctor, view your test results, renew your prescriptions, schedule appointments and more. To sign up, go to [email or web address] . Click on \"Log in\" on the left side of the screen, which will take you to the Welcome page. Then click on \"Sign up Now\" on the right side of the page.     You will be asked to enter the access code listed below, as well as some personal information. Please follow the directions to create your username and password.     Your access code is: [email or web address]  Expires: 2017  4:36 AM     Your access code will  in 90 days. If you need help or a new code, please call your [place] clinic or [phone].        Care EveryWhere ID     This is your Care EveryWhere ID. This could be used by other organizations to access your [place] medical records  [id number]        Equal Access to Services     LUDY HICKEY AH: Filiberto Hein, aftab hunter, qaybta kaalmada adeegyaorlando kelly. " So [facility] [phone].    ATENCIÓN: Si habla español, tiene a grant disposición servicios gratuitos de asistencia lingüística. Llame al [phone].    We comply with applicable federal civil rights laws and [place] laws. We do not discriminate on the basis of race, color, national origin, age, disability sex, sexual orientation or gender identity.

## 2017-06-30 ASSESSMENT — PATIENT HEALTH QUESTIONNAIRE - PHQ9: 5. POOR APPETITE OR OVEREATING: MORE THAN HALF THE DAYS

## 2017-06-30 ASSESSMENT — ANXIETY QUESTIONNAIRES
7. FEELING AFRAID AS IF SOMETHING AWFUL MIGHT HAPPEN: MORE THAN HALF THE DAYS
2. NOT BEING ABLE TO STOP OR CONTROL WORRYING: NEARLY EVERY DAY
GAD7 TOTAL SCORE: 16
1. FEELING NERVOUS, ANXIOUS, OR ON EDGE: NEARLY EVERY DAY
5. BEING SO RESTLESS THAT IT IS HARD TO SIT STILL: SEVERAL DAYS
IF YOU CHECKED OFF ANY PROBLEMS ON THIS QUESTIONNAIRE, HOW DIFFICULT HAVE THESE PROBLEMS MADE IT FOR YOU TO DO YOUR WORK, TAKE CARE OF THINGS AT HOME, OR GET ALONG WITH OTHER PEOPLE: SOMEWHAT DIFFICULT
6. BECOMING EASILY ANNOYED OR IRRITABLE: MORE THAN HALF THE DAYS
3. WORRYING TOO MUCH ABOUT DIFFERENT THINGS: NEARLY EVERY DAY

## 2017-06-30 NOTE — PROGRESS NOTES
Progress Note    Client Name: Suzie Castillo  Date: 6/30/2017           Service Type: Individual      Session Start Time: 3pm  Session End Time: 350      Session Length:50     Session #:3     Attendees: Client attended alone    Treatment Plan Last Reviewed: today  PHQ-9 / TWYLA-7 : 15;16     DATA      Progress Since Last Session (Related to Symptoms / Goals / Homework):   Symptoms: Stable    Homework: Completed in session      Episode of Care Goals: Minimal progress - PREPARATION (Decided to change - considering how); Intervened by negotiating a change plan and determining options / strategies for behavior change, identifying triggers, exploring social supports, and working towards setting a date to begin behavior change     Current / Ongoing Stressors and Concerns:   Recent breakup of 6 year relationship; grief issues     Treatment Objective(s) Addressed in This Session:   management of mood and procesing grief. Wants to undestand patterns underpinning dysfunction in relaitonships.       Intervention:   Systems/CBT. Build self awareness.        ASSESSMENT: Current Emotional / Mental Status (status of significant symptoms):   Risk status (Self / Other harm or suicidal ideation)   Client denies current fears or concerns for personal safety.   Client denies current or recent suicidal ideation or behaviors.   Client denies current or recent homicidal ideation or behaviors.   Client denies current or recent self injurious behavior or ideation.   Client denies other safety concerns.   A safety and risk management plan has not been developed at this time, however client was given the after-hours number / 911 should there be a change in any of these risk factors.     Appearance:   Appropriate    Eye Contact:   Good    Psychomotor Behavior: Normal    Attitude:   Cooperative    Orientation:   All   Speech    Rate / Production: Normal     Volume:  Normal    Mood:    Anxious     Affect:    Appropriate    Thought Content:  Clear    Thought Form:  Coherent  Logical    Insight:    Good      Medication Review:   No current psychiatric medications prescribed     Medication Compliance:   Yes     Changes in Health Issues:   None reported     Chemical Use Review:   Substance Use: Chemical use reviewed, no active concerns identified      Tobacco Use: No current tobacco use.       Collateral Reports Completed:   Not Applicable    PLAN: (Client Tasks / Therapist Tasks / Other)  Complete tx plan; Returns to [facility] in 2 weeks.        Katharina Reyez, LICSW                                                         ________________________________________________________________________    Treatment Plan    Client's Name: Suzie Castillo  YOB: 1980    Date: 6/30/2017      DSM-V Diagnoses: Adjustment Disorders  309.28 (F43.23) With mixed anxiety and depressed mood  Psychosocial / Contextual Factors: Good social/family supports  WHODAS: see intake.    Referral / Collaboration:  Referral to another professional/service is not indicated at this time..    Anticipated number of session or this episode of care: evaluate every 90 days.      MeasurableTreatment Goal(s) related to diagnosis / functional impairment(s)  Goal 1: Client will build self awareness.    I will know I've met my goal when I understand better what went wrong.      Objective #A (Client Action)    Client will be able to ID and chanllenge patterns in thinking feeling and behavior that are self defeating..  Status: new     Intervention(s)  Therapist will teach CBT/mindfulness..    Objective #B  Client will process losses and greif..  Status: new     Intervention(s)  Therapist will provide education/support and resources as indicated..    Objective #C  Client will Engage in 2 self care behaviors on weekly basis..  Status: new     Intervention(s)  Therapist will provide support and resources as indicated..    Client has reviewed  and agreed to the above plan.      Katharina Reyez, [facility] Care[facility]  June 30, 2017

## 2017-07-01 ASSESSMENT — ANXIETY QUESTIONNAIRES: GAD7 TOTAL SCORE: 16

## 2017-07-01 ASSESSMENT — PATIENT HEALTH QUESTIONNAIRE - PHQ9: SUM OF ALL RESPONSES TO PHQ QUESTIONS 1-9: 15

## 2017-07-18 ENCOUNTER — TELEPHONE (OUTPATIENT)
Dept: PSYCHOLOGY | Facility: CLINIC | Age: 37
End: 2017-07-18

## 2017-07-19 ENCOUNTER — OFFICE VISIT (OUTPATIENT)
Dept: PSYCHOLOGY | Facility: CLINIC | Age: 37
End: 2017-07-19
Payer: COMMERCIAL

## 2017-07-19 DIAGNOSIS — F43.23 ADJUSTMENT DISORDER WITH MIXED ANXIETY AND DEPRESSED MOOD: Primary | ICD-10-CM

## 2017-07-19 PROCEDURE — 90834 PSYTX W PT 45 MINUTES: CPT | Performed by: SOCIAL WORKER

## 2017-07-19 ASSESSMENT — ANXIETY QUESTIONNAIRES
1. FEELING NERVOUS, ANXIOUS, OR ON EDGE: NEARLY EVERY DAY
3. WORRYING TOO MUCH ABOUT DIFFERENT THINGS: NEARLY EVERY DAY
6. BECOMING EASILY ANNOYED OR IRRITABLE: SEVERAL DAYS
2. NOT BEING ABLE TO STOP OR CONTROL WORRYING: MORE THAN HALF THE DAYS
7. FEELING AFRAID AS IF SOMETHING AWFUL MIGHT HAPPEN: SEVERAL DAYS
GAD7 TOTAL SCORE: 13
IF YOU CHECKED OFF ANY PROBLEMS ON THIS QUESTIONNAIRE, HOW DIFFICULT HAVE THESE PROBLEMS MADE IT FOR YOU TO DO YOUR WORK, TAKE CARE OF THINGS AT HOME, OR GET ALONG WITH OTHER PEOPLE: VERY DIFFICULT
5. BEING SO RESTLESS THAT IT IS HARD TO SIT STILL: SEVERAL DAYS

## 2017-07-19 ASSESSMENT — PATIENT HEALTH QUESTIONNAIRE - PHQ9: 5. POOR APPETITE OR OVEREATING: MORE THAN HALF THE DAYS

## 2017-07-19 NOTE — MR AVS SNAPSHOT
"                  MRN:[id number]                      After Visit Summary   2017    Suzie Castillo    MRN: [id number]           Visit Information        Provider Department      2017 11:00 AM Katharina Reyez, [facility]      Your next 10 appointments already scheduled     2017 12:30 PM CDT   Return Visit with Katharina Reyez [facility] [place] ([facility] [place])    [address]  [place] [address]   [phone]            Aug 09, 2017  4:00 PM CDT   Return Visit with Katharina Reyez [facility] [place] ([facility] [place])    [address]  [facility] [address]   [phone]              MyChart Information     [employer] lets you send messages to your doctor, view your test results, renew your prescriptions, schedule appointments and more. To sign up, go to [email or web address] . Click on \"Log in\" on the left side of the screen, which will take you to the Welcome page. Then click on \"Sign up Now\" on the right side of the page.     You will be asked to enter the access code listed below, as well as some personal information. Please follow the directions to create your username and password.     Your access code is: [email or web address]  Expires: 10/17/2017  1:13 PM     Your access code will  in 90 days. If you need help or a new code, please call your [place] clinic or [phone].        Care EveryWhere ID     This is your Care EveryWhere ID. This could be used by other organizations to access your [place] medical records  [id number]        Equal Access to Services     [facility] Hospital[facility] AH: Hadii sacha Hein, waaxda luqadaha, qaybta kaalmada adeartieyada, orlando pichardo. So [facility] [phone].    ATENCIÓN: Si habla español, tiene a grant disposición servicios gratuitos de asistencia lingüística. Llame al [phone].    We comply with applicable federal civil " rights laws and [place] laws. We do not discriminate on the basis of race, color, national origin, age, disability sex, sexual orientation or gender identity.

## 2017-07-19 NOTE — PROGRESS NOTES
Progress Note    Client Name: Suzie Castillo  Date: 7/19/2017           Service Type: Individual      Session Start Time: 11am  Session End Time: 1150      Session Length:50     Session #:  4     Attendees: Client attended alone    Treatment Plan Last Reviewed: today  PHQ-9 / TWYLA-7 : 17;13     DATA      Progress Since Last Session (Related to Symptoms / Goals / Homework):   Symptoms: Stable    Homework: Completed in session      Episode of Care Goals: Minimal progress - PREPARATION (Decided to change - considering how); Intervened by negotiating a change plan and determining options / strategies for behavior change, identifying triggers, exploring social supports, and working towards setting a date to begin behavior change. Wanting to work on anxiety and grief. Began meditating and was able to cry/grieve. Reports not adequately grieving her mother due to having been in a troubled relationship. Feels fragile overall and not sleeping well. Worries about her level of stress on her body/health.     Current / Ongoing Stressors and Concerns:   Recent breakup of 6 year relationship; grief issues     Treatment Objective(s) Addressed in This Session:   management of mood and procesing grief. Wants to undestand patterns underpinning dysfunction in relaitonships.  Introduced neuroscience information today and gave HW  Assignment to begin mapping early relationship patterns with caregivers.       Intervention:   Systems/CBT. Build self awareness.        ASSESSMENT: Current Emotional / Mental Status (status of significant symptoms):   Risk status (Self / Other harm or suicidal ideation)   Client denies current fears or concerns for personal safety.   Client denies current or recent suicidal ideation or behaviors.   Client denies current or recent homicidal ideation or behaviors.   Client denies current or recent self injurious behavior or ideation.   Client denies other safety  concerns.   A safety and risk management plan has not been developed at this time, however client was given the after-hours number / 911 should there be a change in any of these risk factors.     Appearance:   Appropriate    Eye Contact:   Good    Psychomotor Behavior: Normal    Attitude:   Cooperative    Orientation:   All   Speech    Rate / Production: Normal     Volume:  Normal    Mood:    Anxious    Affect:    Appropriate    Thought Content:  Clear    Thought Form:  Coherent  Logical    Insight:    Good      Medication Review:   No current psychiatric medications prescribed     Medication Compliance:   Yes     Changes in Health Issues:   None reported     Chemical Use Review:   Substance Use: Chemical use reviewed, no active concerns identified      Tobacco Use: No current tobacco use.       Collateral Reports Completed:   Not Applicable    PLAN: (Client Tasks / Therapist Tasks / Other)  Continue with self care objectives; complete HW      MIS Reid                                                         ________________________________________________________________________    Treatment Plan    Client's Name: Suzie Castillo  YOB: 1980    Date: 6/30/2017      DSM-V Diagnoses: Adjustment Disorders  309.28 (F43.23) With mixed anxiety and depressed mood  Psychosocial / Contextual Factors: Good social/family supports  WHODAS: see intake.    Referral / Collaboration:  Referral to another professional/service is not indicated at this time..    Anticipated number of session or this episode of care: evaluate every 90 days.      MeasurableTreatment Goal(s) related to diagnosis / functional impairment(s)  Goal 1: Client will build self awareness.    I will know I've met my goal when I understand better what went wrong.      Objective #A (Client Action)    Client will be able to ID and chanllenge patterns in thinking feeling and behavior that are self defeating..  Status: new      Intervention(s)  Therapist will teach CBT/mindfulness..    Objective #B  Client will process losses and greif..  Status: new     Intervention(s)  Therapist will provide education/support and resources as indicated..    Objective #C  Client will Engage in 2 self care behaviors on weekly basis..  Status: new     Intervention(s)  Therapist will provide support and resources as indicated..    Client has reviewed and agreed to the above plan.      Katharina Reyez, MIS  June 30, 2017

## 2017-07-20 ASSESSMENT — ANXIETY QUESTIONNAIRES: GAD7 TOTAL SCORE: 13

## 2017-07-20 ASSESSMENT — PATIENT HEALTH QUESTIONNAIRE - PHQ9: SUM OF ALL RESPONSES TO PHQ QUESTIONS 1-9: 17

## 2017-07-24 DIAGNOSIS — F41.8 DEPRESSION WITH ANXIETY: ICD-10-CM

## 2017-07-24 NOTE — TELEPHONE ENCOUNTER
LORazepam (ATIVAN) 0.5 MG tablet        Last Written Prescription Date: 6/16/2017  Last Fill Quantity: 20,  # refills: 0   Last Office Visit with G, [facility] or [facility] prescribing provider: 6/13/2017                                         Next 5 appointments (look out 90 days)     Jul 28, 2017 12:30 PM CDT   Return Visit with Katharina Reyez, [facility] [place] ([facility] [place]    [address]  [place] [address]   [phone]            Aug 09, 2017  4:00 PM CDT   Return Visit with Katharina Reyez, [facility] [place] [facility] [place]    [address]  [facility] [address]   [phone]

## 2017-07-26 RX ORDER — LORAZEPAM 0.5 MG/1
TABLET ORAL
Qty: 20 TABLET | Refills: 0 | Status: SHIPPED | OUTPATIENT
Start: 2017-07-26 | End: 2017-09-19

## 2017-08-02 ENCOUNTER — OFFICE VISIT (OUTPATIENT)
Dept: PSYCHOLOGY | Facility: CLINIC | Age: 37
End: 2017-08-02
Payer: COMMERCIAL

## 2017-08-02 DIAGNOSIS — F43.23 ADJUSTMENT DISORDER WITH MIXED ANXIETY AND DEPRESSED MOOD: Primary | ICD-10-CM

## 2017-08-02 PROCEDURE — 90834 PSYTX W PT 45 MINUTES: CPT | Performed by: SOCIAL WORKER

## 2017-08-02 ASSESSMENT — ANXIETY QUESTIONNAIRES
2. NOT BEING ABLE TO STOP OR CONTROL WORRYING: MORE THAN HALF THE DAYS
GAD7 TOTAL SCORE: 12
3. WORRYING TOO MUCH ABOUT DIFFERENT THINGS: MORE THAN HALF THE DAYS
6. BECOMING EASILY ANNOYED OR IRRITABLE: SEVERAL DAYS
5. BEING SO RESTLESS THAT IT IS HARD TO SIT STILL: SEVERAL DAYS
IF YOU CHECKED OFF ANY PROBLEMS ON THIS QUESTIONNAIRE, HOW DIFFICULT HAVE THESE PROBLEMS MADE IT FOR YOU TO DO YOUR WORK, TAKE CARE OF THINGS AT HOME, OR GET ALONG WITH OTHER PEOPLE: SOMEWHAT DIFFICULT
1. FEELING NERVOUS, ANXIOUS, OR ON EDGE: NEARLY EVERY DAY
7. FEELING AFRAID AS IF SOMETHING AWFUL MIGHT HAPPEN: SEVERAL DAYS

## 2017-08-02 ASSESSMENT — PATIENT HEALTH QUESTIONNAIRE - PHQ9: 5. POOR APPETITE OR OVEREATING: MORE THAN HALF THE DAYS

## 2017-08-02 NOTE — PROGRESS NOTES
Progress Note    Client Name: Suzie Castillo  Date: 8/2/2017             Service Type: Individual      Session Start Time: 12pm  Session End Time: 1250      Session Length:50     Session #:  5     Attendees: Client attended alone    Treatment Plan Last Reviewed: today  PHQ-9 / TWYLA-7 : 12;12   DATA      Progress Since Last Session (Related to Symptoms / Goals / Homework):   Symptoms: Stable    Homework: Completed in session      Episode of Care Goals: Minimal progress - PREPARATION (Decided to change - considering how); Intervened by negotiating a change plan and determining options / strategies for behavior change, identifying triggers, exploring social supports, and working towards setting a date to begin behavior change. Did neuroscience hw and we began processing early fam of o relational experiences starting with her mother. Able to discern attachment type and safety behaviors and how this showed up in her last relationship. Will cont upon return to clinic.       Current / Ongoing Stressors and Concerns:   Recent breakup of 6 year relationship; grief issues     Treatment Objective(s) Addressed in This Session:   management of mood and procesing grief. Wants to undestand patterns underpinning dysfunction in relaitonships.  Worked on neuroscience home work today recollecting relational experiences with mother.      Intervention:   Systems/CBT. Build self awareness.        ASSESSMENT: Current Emotional / Mental Status (status of significant symptoms):   Risk status (Self / Other harm or suicidal ideation)   Client denies current fears or concerns for personal safety.   Client denies current or recent suicidal ideation or behaviors.   Client denies current or recent homicidal ideation or behaviors.   Client denies current or recent self injurious behavior or ideation.   Client denies other safety concerns.   A safety and risk management plan has not been developed  at this time, however client was given the after-hours number / 911 should there be a change in any of these risk factors.     Appearance:   Appropriate    Eye Contact:   Good    Psychomotor Behavior: Normal    Attitude:   Cooperative    Orientation:   All   Speech    Rate / Production: Normal     Volume:  Normal    Mood:    Anxious    Affect:    Appropriate    Thought Content:  Clear    Thought Form:  Coherent  Logical    Insight:    Good      Medication Review:   No current psychiatric medications prescribed     Medication Compliance:   Yes     Changes in Health Issues:   None reported     Chemical Use Review:   Substance Use: Chemical use reviewed, no active concerns identified      Tobacco Use: No current tobacco use.       Collateral Reports Completed:   Not Applicable    PLAN: (Client Tasks / Therapist Tasks / Other)  Continue with self care objectives; continue with HW      Katharina Reyez, LICSW                                                         ________________________________________________________________________    Treatment Plan    Client's Name: Suzie Castillo  YOB: 1980    Date: 6/30/2017      DSM-V Diagnoses: Adjustment Disorders  309.28 (F43.23) With mixed anxiety and depressed mood  Psychosocial / Contextual Factors: Good social/family supports  WHODAS: see intake.    Referral / Collaboration:  Referral to another professional/service is not indicated at this time..    Anticipated number of session or this episode of care: evaluate every 90 days.      MeasurableTreatment Goal(s) related to diagnosis / functional impairment(s)  Goal 1: Client will build self awareness.    I will know I've met my goal when I understand better what went wrong.      Objective #A (Client Action)    Client will be able to ID and chanllenge patterns in thinking feeling and behavior that are self defeating..  Status: new     Intervention(s)  Therapist will teach CBT/mindfulness..    Objective  #B  Client will process losses and greif..  Status: new     Intervention(s)  Therapist will provide education/support and resources as indicated..    Objective #C  Client will Engage in 2 self care behaviors on weekly basis..  Status: new     Intervention(s)  Therapist will provide support and resources as indicated..    Client has reviewed and agreed to the above plan.      Katharina Reyez, MIS  June 30, 2017

## 2017-08-02 NOTE — MR AVS SNAPSHOT
"                  MRN:[id number]                      After Visit Summary   2017    Suzie Castillo    MRN: [id number]           Visit Information        Provider Department      2017 12:00 PM Katharina Reyez, [facility] [place] [facility]      Your next 10 appointments already scheduled     Aug 09, 2017  4:00 PM CDT   Return Visit with Katharina Reyez [facility] [place] ([facility])    [address]  [place] [address]   [phone]            Aug 21, 2017  2:00 PM CDT   Return Visit with Katharina Reyez [facility] [place] ([facility] [place])    [address]  [place] [address]   [phone]            Aug 29, 2017  4:00 PM CDT   Return Visit with Katharina Reyez [facility] [place] ([facility] [place])    [address]  [place] [address]   [phone]              MyChart Information     [employer] lets you send messages to your doctor, view your test results, renew your prescriptions, schedule appointments and more. To sign up, go to [email or web address] . Click on \"Log in\" on the left side of the screen, which will take you to the Welcome page. Then click on \"Sign up Now\" on the right side of the page.     You will be asked to enter the access code listed below, as well as some personal information. Please follow the directions to create your username and password.     Your access code is: [email or web address]  Expires: 10/17/2017  1:13 PM     Your access code will  in 90 days. If you need help or a new code, please call your [place] clinic or [phone].        Care EveryWhere ID     This is your Care EveryWhere ID. This could be used by other organizations to access your [place] medical records  [id number]        Equal Access to Services     LUDY HICKEY : Filiberto Hein, aftab hunter, qaybta kaalmada adeegorlando mcdonald. " So [facility] [phone].    ATENCIÓN: Si habla español, tiene a grant disposición servicios gratuitos de asistencia lingüística. Llame al [phone].    We comply with applicable federal civil rights laws and [place] laws. We do not discriminate on the basis of race, color, national origin, age, disability sex, sexual orientation or gender identity.

## 2017-08-03 ASSESSMENT — PATIENT HEALTH QUESTIONNAIRE - PHQ9: SUM OF ALL RESPONSES TO PHQ QUESTIONS 1-9: 12

## 2017-08-03 ASSESSMENT — ANXIETY QUESTIONNAIRES: GAD7 TOTAL SCORE: 12

## 2017-08-09 ENCOUNTER — OFFICE VISIT (OUTPATIENT)
Dept: PSYCHOLOGY | Facility: CLINIC | Age: 37
End: 2017-08-09
Payer: COMMERCIAL

## 2017-08-09 DIAGNOSIS — F43.23 ADJUSTMENT DISORDER WITH MIXED ANXIETY AND DEPRESSED MOOD: Primary | ICD-10-CM

## 2017-08-09 PROCEDURE — 90834 PSYTX W PT 45 MINUTES: CPT | Performed by: SOCIAL WORKER

## 2017-08-09 NOTE — MR AVS SNAPSHOT
"                  MRN:[id number]                      After Visit Summary   2017    Suzie Castillo    MRN: [id number]           Visit Information        Provider Department      2017 4:00 PM Katharina Reyez, [facility]      Your next 10 appointments already scheduled     Aug 21, 2017  2:00 PM CDT   Return Visit with Katharina Reyez [facility] [place] ([facility])    [address]  [facility] [address]   [phone]            Aug 29, 2017  4:00 PM CDT   Return Visit with Katharina Reyez [facility] [place] ([facility])    [address]  [facility] [address] [phone]              MyChart Information     [employer] lets you send messages to your doctor, view your test results, renew your prescriptions, schedule appointments and more. To sign up, go to [email or web address] . Click on \"Log in\" on the left side of the screen, which will take you to the Welcome page. Then click on \"Sign up Now\" on the right side of the page.     You will be asked to enter the access code listed below, as well as some personal information. Please follow the directions to create your username and password.     Your access code is: [email or web address]  Expires: 10/17/2017  1:13 PM     Your access code will  in 90 days. If you need help or a new code, please call your [place] clinic or [phone].        Care EveryWhere ID     This is your Care EveryWhere ID. This could be used by other organizations to access your [place] medical records  [id number]        Equal Access to Services     NGUYEN HICKEY AH: Hadii sacha Hein, waaxda luqadaha, qaybta kaalmada adeartieyada, orlando pichardo. So [place] [phone].    ATENCIÓN: Si habla español, tiene a grant disposición servicios gratuitos de asistencia lingüística. Llame al [phone].    We comply with applicable federal civil rights " laws and [place] laws. We do not discriminate on the basis of race, color, national origin, age, disability sex, sexual orientation or gender identity.

## 2017-08-10 ASSESSMENT — PATIENT HEALTH QUESTIONNAIRE - PHQ9
5. POOR APPETITE OR OVEREATING: MORE THAN HALF THE DAYS
SUM OF ALL RESPONSES TO PHQ QUESTIONS 1-9: 14

## 2017-08-10 ASSESSMENT — ANXIETY QUESTIONNAIRES
7. FEELING AFRAID AS IF SOMETHING AWFUL MIGHT HAPPEN: SEVERAL DAYS
3. WORRYING TOO MUCH ABOUT DIFFERENT THINGS: MORE THAN HALF THE DAYS
5. BEING SO RESTLESS THAT IT IS HARD TO SIT STILL: SEVERAL DAYS
IF YOU CHECKED OFF ANY PROBLEMS ON THIS QUESTIONNAIRE, HOW DIFFICULT HAVE THESE PROBLEMS MADE IT FOR YOU TO DO YOUR WORK, TAKE CARE OF THINGS AT HOME, OR GET ALONG WITH OTHER PEOPLE: SOMEWHAT DIFFICULT
2. NOT BEING ABLE TO STOP OR CONTROL WORRYING: MORE THAN HALF THE DAYS
1. FEELING NERVOUS, ANXIOUS, OR ON EDGE: NEARLY EVERY DAY
GAD7 TOTAL SCORE: 11
6. BECOMING EASILY ANNOYED OR IRRITABLE: NOT AT ALL
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
## 2017-08-11 ASSESSMENT — ANXIETY QUESTIONNAIRES: GAD7 TOTAL SCORE: 11

## 2017-08-21 ENCOUNTER — OFFICE VISIT (OUTPATIENT)
Dept: PSYCHOLOGY | Facility: CLINIC | Age: 37
End: 2017-08-21
Payer: COMMERCIAL

## 2017-08-21 DIAGNOSIS — F43.23 ADJUSTMENT DISORDER WITH MIXED ANXIETY AND DEPRESSED MOOD: Primary | ICD-10-CM

## 2017-08-21 PROCEDURE — 90834 PSYTX W PT 45 MINUTES: CPT | Performed by: SOCIAL WORKER

## 2017-08-21 ASSESSMENT — ANXIETY QUESTIONNAIRES
3. WORRYING TOO MUCH ABOUT DIFFERENT THINGS: SEVERAL DAYS
7. FEELING AFRAID AS IF SOMETHING AWFUL MIGHT HAPPEN: SEVERAL DAYS
1. FEELING NERVOUS, ANXIOUS, OR ON EDGE: SEVERAL DAYS
IF YOU CHECKED OFF ANY PROBLEMS ON THIS QUESTIONNAIRE, HOW DIFFICULT HAVE THESE PROBLEMS MADE IT FOR YOU TO DO YOUR WORK, TAKE CARE OF THINGS AT HOME, OR GET ALONG WITH OTHER PEOPLE: SOMEWHAT DIFFICULT
2. NOT BEING ABLE TO STOP OR CONTROL WORRYING: SEVERAL DAYS
5. BEING SO RESTLESS THAT IT IS HARD TO SIT STILL: NOT AT ALL
6. BECOMING EASILY ANNOYED OR IRRITABLE: SEVERAL DAYS
GAD7 TOTAL SCORE: 7

## 2017-08-21 ASSESSMENT — PATIENT HEALTH QUESTIONNAIRE - PHQ9
5. POOR APPETITE OR OVEREATING: MORE THAN HALF THE DAYS
SUM OF ALL RESPONSES TO PHQ QUESTIONS 1-9: 13

## 2017-08-21 NOTE — MR AVS SNAPSHOT
"                  MRN:[id number]                      After Visit Summary   2017    Suzie Castillo    MRN: [id number]           Visit Information        Provider Department      2017 2:00 PM Katharina Reyez, [facility]      Your next 10 appointments already scheduled     Aug 29, 2017  4:00 PM CDT   Return Visit with Katharina Reyez [facility] [place] ([facility] [place])    [address]  [facility] [address]   [phone]            Sep 06, 2017  5:00 PM CDT   Return Visit with Katharina Reyez [facility] [place] ([facility])    [address]  [facility] [address] [phone]              MyChart Information     [employer] lets you send messages to your doctor, view your test results, renew your prescriptions, schedule appointments and more. To sign up, go to [email or web address] . Click on \"Log in\" on the left side of the screen, which will take you to the Welcome page. Then click on \"Sign up Now\" on the right side of the page.     You will be asked to enter the access code listed below, as well as some personal information. Please follow the directions to create your username and password.     Your access code is: [email or web address]  Expires: 10/17/2017  1:13 PM     Your access code will  in 90 days. If you need help or a new code, please call your [place] clinic or [phone].        Care EveryWhere ID     This is your Care EveryWhere ID. This could be used by other organizations to access your [place] medical records  [id number]        Equal Access to Services     [facility] EDELMIRA AH: Hadii sacha Hein, waaxda luqadaha, qaybta kaalmada adeartieyada, orlando pichardo. So [facility] [phone].    ATENCIÓN: Si habla español, tiene a grant disposición servicios gratuitos de asistencia lingüística. Llame al [phone].    We comply with applicable federal civil " rights laws and [place] laws. We do not discriminate on the basis of race, color, national origin, age, disability sex, sexual orientation or gender identity.
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
## 2017-08-22 DIAGNOSIS — F41.8 DEPRESSION WITH ANXIETY: ICD-10-CM

## 2017-08-22 ASSESSMENT — ANXIETY QUESTIONNAIRES: GAD7 TOTAL SCORE: 7

## 2017-08-22 NOTE — TELEPHONE ENCOUNTER
citalopram (CELEXA) 40 MG tablet       Last Written Prescription Date: 6/14/2017  Last Fill Quantity: 30, # refills: 1  Last Office Visit with [facility] primary care provider:  6/13/2017   Next 5 appointments (look out 90 days)     Aug 29, 2017  4:00 PM CDT   Return Visit with Katharina Reyez, [facility] [place] ([facility] [place])    [address]  [place] [address]   [phone]            Sep 06, 2017  5:00 PM CDT   Return Visit with Katharina Reyez, [facility] [place] ([facility] [place])    [address]  [place] [address]   [phone]                   Last PHQ-9 score on record=   PHQ-9 SCORE 8/21/2017   Total Score 13

## 2017-08-23 RX ORDER — CITALOPRAM HYDROBROMIDE 40 MG/1
TABLET ORAL
Qty: 30 TABLET | Refills: 0 | Status: SHIPPED | OUTPATIENT
Start: 2017-08-23 | End: 2017-09-19

## 2017-08-23 NOTE — TELEPHONE ENCOUNTER
Sent in prescription for 30 day supply.     TCs: Patient needs future appt with Dr. oDshi. She was suppose to follow up 1 month from last OV 6/13/17  Medication was filled for 1 month.    Shantel Powers RN

## 2017-08-29 ENCOUNTER — OFFICE VISIT (OUTPATIENT)
Dept: PSYCHOLOGY | Facility: CLINIC | Age: 37
End: 2017-08-29
Payer: COMMERCIAL

## 2017-08-29 DIAGNOSIS — F43.23 ADJUSTMENT DISORDER WITH MIXED ANXIETY AND DEPRESSED MOOD: Primary | ICD-10-CM

## 2017-08-29 PROCEDURE — 90834 PSYTX W PT 45 MINUTES: CPT | Performed by: SOCIAL WORKER

## 2017-08-29 NOTE — MR AVS SNAPSHOT
"                  MRN:[id number]                      After Visit Summary   2017    Suzie Castillo    MRN: [id number]           Visit Information        Provider Department      2017 4:00 PM Dereje Katharina, [facility]      Your next 10 appointments already scheduled     Sep 05, 2017  4:30 PM CDT   [facility]    [address] [address]   [phone]           Bring a current list of meds and any records pertaining to this visit. For Physicals, please bring immunization records and any forms needing to be filled out. Please arrive 10 minutes early to complete paperwork.            Sep 06, 2017  5:00 PM CDT   Return Visit with Katharina Mckennatigistmelonie [facility] ([facility])    [address]  [facility] [address]   [phone]            Sep 20, 2017  4:00 PM CDT   Return Visit with Katharina Reyez [facility] ([facility])    [address]  [facility] [address]   [phone]              MyChart Information     [employer] lets you send messages to your doctor, view your test results, renew your prescriptions, schedule appointments and more. To sign up, go to [email or web address] . Click on \"Log in\" on the left side of the screen, which will take you to the Welcome page. Then click on \"Sign up Now\" on the right side of the page.     You will be asked to enter the access code listed below, as well as some personal information. Please follow the directions to create your username and password.     Your access code is: [email or web address]  Expires: 10/17/2017  1:13 PM     Your access code will  in 90 days. If you need help or a new code, please call your [facility] or [phone].        Care EveryWhere ID     This is your Care EveryWhere ID. This could be used by other organizations to " access your [place] medical records  [id number]        Equal Access to Services     LUDY HICKEY : Filiberto Hein, aftab hunter, pillo gooden, orlando pichardo. So [facility] [phone].    ATENCIÓN: Si habla español, tiene a grant disposición servicios gratuitos de asistencia lingüística. Llame al [phone].    We comply with applicable federal civil rights laws and [place] laws. We do not discriminate on the basis of race, color, national origin, age, disability sex, sexual orientation or gender identity.

## 2017-08-30 ASSESSMENT — ANXIETY QUESTIONNAIRES
IF YOU CHECKED OFF ANY PROBLEMS ON THIS QUESTIONNAIRE, HOW DIFFICULT HAVE THESE PROBLEMS MADE IT FOR YOU TO DO YOUR WORK, TAKE CARE OF THINGS AT HOME, OR GET ALONG WITH OTHER PEOPLE: SOMEWHAT DIFFICULT
6. BECOMING EASILY ANNOYED OR IRRITABLE: SEVERAL DAYS
GAD7 TOTAL SCORE: 11
2. NOT BEING ABLE TO STOP OR CONTROL WORRYING: MORE THAN HALF THE DAYS
3. WORRYING TOO MUCH ABOUT DIFFERENT THINGS: SEVERAL DAYS
5. BEING SO RESTLESS THAT IT IS HARD TO SIT STILL: NOT AT ALL
1. FEELING NERVOUS, ANXIOUS, OR ON EDGE: NEARLY EVERY DAY
7. FEELING AFRAID AS IF SOMETHING AWFUL MIGHT HAPPEN: SEVERAL DAYS

## 2017-08-30 ASSESSMENT — PATIENT HEALTH QUESTIONNAIRE - PHQ9
5. POOR APPETITE OR OVEREATING: NEARLY EVERY DAY
SUM OF ALL RESPONSES TO PHQ QUESTIONS 1-9: 15
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
## 2017-08-31 ASSESSMENT — ANXIETY QUESTIONNAIRES: GAD7 TOTAL SCORE: 11

## 2017-09-06 ENCOUNTER — OFFICE VISIT (OUTPATIENT)
Dept: PSYCHOLOGY | Facility: CLINIC | Age: 37
End: 2017-09-06
Payer: COMMERCIAL

## 2017-09-06 DIAGNOSIS — F43.23 ADJUSTMENT DISORDER WITH MIXED ANXIETY AND DEPRESSED MOOD: Primary | ICD-10-CM

## 2017-09-06 PROCEDURE — 90834 PSYTX W PT 45 MINUTES: CPT | Performed by: SOCIAL WORKER

## 2017-09-06 NOTE — MR AVS SNAPSHOT
"                  MRN:[id number]                      After Visit Summary   2017    Suzie Castillo    MRN: [id number]           Visit Information        Provider Department      2017 5:00 PM DerejeKatharina [facility]      Your next 10 appointments already scheduled     Sep 19, 2017  6:00 PM CDT   [facility]   [facility]    [address] [address]   [phone]           Bring a current list of meds and any records pertaining to this visit. For Physicals, please bring immunization records and any forms needing to be filled out. Please arrive 10 minutes early to complete paperwork.            Sep 20, 2017  4:00 PM CDT   Return Visit with Katharina Mckennatigistmelonie [facility] ([facility])    [address] [place] [address] [address]  [facility] [address]   [phone]            Oct 04, 2017 11:00 AM CDT   Return Visit with Katharina Reyez [facility] ([facility])    [address]  [address]  [facility] [address]   [phone]              MyChart Information     [employer] lets you send messages to your doctor, view your test results, renew your prescriptions, schedule appointments and more. To sign up, go to [email or web address] . Click on \"Log in\" on the left side of the screen, which will take you to the Welcome page. Then click on \"Sign up Now\" on the right side of the page.     You will be asked to enter the access code listed below, as well as some personal information. Please follow the directions to create your username and password.     Your access code is: [email or web address]  Expires: 10/17/2017  1:13 PM     Your access code will  in 90 days. If you need help or a new code, please call your [facility] or [phone].        Care EveryWhere ID     This is your Care EveryWhere ID. This could be used by other organizations to " access your [place] medical records  [id number]        Equal Access to Services     LUDY HICKEY : Filiberto Hein, aftab hunter, pillo gooden, orlando pichardo. So [facility] [phone].    ATENCIÓN: Si habla español, tiene a grant disposición servicios gratuitos de asistencia lingüística. Llame al [phone].    We comply with applicable federal civil rights laws and [place] laws. We do not discriminate on the basis of race, color, national origin, age, disability sex, sexual orientation or gender identity.

## 2017-09-07 ASSESSMENT — PATIENT HEALTH QUESTIONNAIRE - PHQ9
SUM OF ALL RESPONSES TO PHQ QUESTIONS 1-9: 5
5. POOR APPETITE OR OVEREATING: SEVERAL DAYS

## 2017-09-07 ASSESSMENT — ANXIETY QUESTIONNAIRES
7. FEELING AFRAID AS IF SOMETHING AWFUL MIGHT HAPPEN: SEVERAL DAYS
GAD7 TOTAL SCORE: 7
2. NOT BEING ABLE TO STOP OR CONTROL WORRYING: SEVERAL DAYS
5. BEING SO RESTLESS THAT IT IS HARD TO SIT STILL: NOT AT ALL
6. BECOMING EASILY ANNOYED OR IRRITABLE: SEVERAL DAYS
IF YOU CHECKED OFF ANY PROBLEMS ON THIS QUESTIONNAIRE, HOW DIFFICULT HAVE THESE PROBLEMS MADE IT FOR YOU TO DO YOUR WORK, TAKE CARE OF THINGS AT HOME, OR GET ALONG WITH OTHER PEOPLE: SOMEWHAT DIFFICULT
3. WORRYING TOO MUCH ABOUT DIFFERENT THINGS: SEVERAL DAYS
1. FEELING NERVOUS, ANXIOUS, OR ON EDGE: MORE THAN HALF THE DAYS
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
## 2017-09-08 ASSESSMENT — ANXIETY QUESTIONNAIRES: GAD7 TOTAL SCORE: 7

## 2017-09-19 ENCOUNTER — OFFICE VISIT (OUTPATIENT)
Dept: FAMILY MEDICINE | Facility: CLINIC | Age: 37
End: 2017-09-19
Payer: COMMERCIAL

## 2017-09-19 VITALS
OXYGEN SATURATION: 98 % | SYSTOLIC BLOOD PRESSURE: 96 MMHG | TEMPERATURE: 97.6 F | HEIGHT: 67 IN | BODY MASS INDEX: 30.79 KG/M2 | HEART RATE: 69 BPM | DIASTOLIC BLOOD PRESSURE: 66 MMHG | WEIGHT: 196.2 LBS

## 2017-09-19 DIAGNOSIS — Z23 NEED FOR PROPHYLACTIC VACCINATION AND INOCULATION AGAINST INFLUENZA: Primary | ICD-10-CM

## 2017-09-19 DIAGNOSIS — F41.8 DEPRESSION WITH ANXIETY: ICD-10-CM

## 2017-09-19 PROCEDURE — 90686 IIV4 VACC NO PRSV 0.5 ML IM: CPT | Performed by: INTERNAL MEDICINE

## 2017-09-19 PROCEDURE — 99213 OFFICE O/P EST LOW 20 MIN: CPT | Mod: 25 | Performed by: INTERNAL MEDICINE

## 2017-09-19 PROCEDURE — 90471 IMMUNIZATION ADMIN: CPT | Performed by: INTERNAL MEDICINE

## 2017-09-19 RX ORDER — LORAZEPAM 0.5 MG/1
TABLET ORAL
Qty: 20 TABLET | Refills: 0 | Status: SHIPPED | OUTPATIENT
Start: 2017-09-19 | End: 2017-11-07

## 2017-09-19 RX ORDER — CITALOPRAM HYDROBROMIDE 40 MG/1
40 TABLET ORAL DAILY
Qty: 90 TABLET | Refills: 3 | Status: SHIPPED | OUTPATIENT
Start: 2017-09-19 | End: 2018-02-16

## 2017-09-19 ASSESSMENT — PATIENT HEALTH QUESTIONNAIRE - PHQ9
5. POOR APPETITE OR OVEREATING: SEVERAL DAYS
SUM OF ALL RESPONSES TO PHQ QUESTIONS 1-9: 16

## 2017-09-19 ASSESSMENT — ANXIETY QUESTIONNAIRES
IF YOU CHECKED OFF ANY PROBLEMS ON THIS QUESTIONNAIRE, HOW DIFFICULT HAVE THESE PROBLEMS MADE IT FOR YOU TO DO YOUR WORK, TAKE CARE OF THINGS AT HOME, OR GET ALONG WITH OTHER PEOPLE: SOMEWHAT DIFFICULT
6. BECOMING EASILY ANNOYED OR IRRITABLE: NOT AT ALL
3. WORRYING TOO MUCH ABOUT DIFFERENT THINGS: MORE THAN HALF THE DAYS
5. BEING SO RESTLESS THAT IT IS HARD TO SIT STILL: NOT AT ALL
7. FEELING AFRAID AS IF SOMETHING AWFUL MIGHT HAPPEN: SEVERAL DAYS
1. FEELING NERVOUS, ANXIOUS, OR ON EDGE: NEARLY EVERY DAY
2. NOT BEING ABLE TO STOP OR CONTROL WORRYING: SEVERAL DAYS
GAD7 TOTAL SCORE: 8

## 2017-09-19 NOTE — PROGRESS NOTES
"  SUBJECTIVE:   Suzie Castillo is a 37 year old female who presents to clinic today for the following health issues:      Medication Followup of Celexa    Taking Medication as prescribed: {.:406312::\"yes\"}    Side Effects:  {NONEORCHOOSE:084514::\"None\"}    Medication Helping Symptoms:  {.:762239::\"yes\"}         Depression and Anxiety Follow-Up    Status since last visit: { :046311::\"No change\"}    Other associated symptoms:{ :218397::\"None\"}    Complicating factors:     Significant life event: { :454495::\"No\"}     Current substance abuse: { :717246::\"None\"}    PHQ-9 SCORE 8/21/2017 8/30/2017 9/7/2017   Total Score 13 15 5     TWYLA-7 SCORE 8/21/2017 8/30/2017 9/7/2017   Total Score 7 11 7       PHQ-9  English  PHQ-9   Any Language  GAD7        Problem list and histories reviewed & adjusted, as indicated.  Additional history: {NONE - AS DOCUMENTED:371386::\"as documented\"}    {HIST REVIEW/ LINKS 2:786339}    Reviewed and updated as needed this visit by clinical staff     Reviewed and updated as needed this visit by Provider         {PROVIDER CHARTING PREFERENCE:638679}  "

## 2017-09-19 NOTE — MR AVS SNAPSHOT
"              After Visit Summary   9/19/2017    Suzie Castillo    MRN: [id number]           Patient Information     Date Of Birth          1980        Visit Information        Provider Department      9/19/2017 6:00 PM Yusuf Doshi MD [facility]        Today's Diagnoses     Need for prophylactic vaccination and inoculation against influenza    -  1    Depression with anxiety           Follow-ups after your visit        Your next 10 appointments already scheduled     Oct 04, 2017 11:00 AM CDT   Return Visit with Katharina Reyez, [facility] [place] ([facility] [place])    [address] [place] [address] [address]  [place] [address] [phone]            Oct 17, 2017  4:00 PM CDT   Return Visit with Katharina Reyez [facility] [place] ([facility])    [address] [place] [address] [address]  [place] [address] [phone]              Who to contact     If you have questions or need follow up information about today's clinic visit or your schedule please contact [facility] directly at [phone].  Normal or non-critical lab and imaging results will be communicated to you by [employer], letter or phone within 4 business days after the clinic has received the results. If you do not hear from us within 7 days, please contact the clinic through [employer] or phone. If you have a critical or abnormal lab result, we will notify you by phone as soon as possible.  Submit refill requests through [employer] or call your pharmacy and they will forward the refill request to us. Please allow 3 business days for your refill to be completed.          Additional Information About Your Visit        MyChart Information     [employer] lets you send messages to your doctor, view your test results, renew your prescriptions, schedule appointments and more. To sign up, go to [email or web address] . Click on \"Log in\" on the left side of the screen, which will take you to the Welcome page. Then " "click on \"Sign up Now\" on the right side of the page.     You will be asked to enter the access code listed below, as well as some personal information. Please follow the directions to create your username and password.     Your access code is: [email or web address]  Expires: 10/17/2017  1:13 PM     Your access code will  in 90 days. If you need help or a new code, please call your [place] clinic or [phone].        Care EveryWhere ID     This is your Care EveryWhere ID. This could be used by other organizations to access your [place] medical records  [id number]        Your Vitals Were     Pulse Temperature Height Pulse Oximetry Breastfeeding? BMI (Body Mass Index)    69 97.6  F (36.4  C) (Oral) 5' 7\" (1.702 m) 98% No 30.73 kg/m2       Blood Pressure from Last 3 Encounters:   17 96/66   17 101/66   17 (!) 139/93    Weight from Last 3 Encounters:   17 196 lb 3.2 oz (89 kg)   17 199 lb 12.8 oz (90.6 kg)   17 201 lb (91.2 kg)              We Performed the Following     FLU VAC, SPLIT VIRUS IM > 3 YO (QUADRIVALENT) [44943]     Vaccine Administration, Initial [51153]          Today's Medication Changes          These changes are accurate as of: 17 11:59 PM.  If you have any questions, ask your nurse or doctor.               These medicines have changed or have updated prescriptions.        Dose/Directions    citalopram 40 MG tablet   Commonly known as:  celeXA   This may have changed:  See the new instructions.   Used for:  Depression with anxiety   Changed by:  Yusuf Doshi MD        Dose:  40 mg   Take 1 tablet (40 mg) by mouth daily   Quantity:  90 tablet   Refills:  3       LORazepam 0.5 MG tablet   Commonly known as:  ATIVAN   This may have changed:  See the new instructions.   Used for:  Depression with anxiety   Changed by:  Yusuf Doshi MD        TAKE 1 TO 2 TABLET BY MOUTH TWICE DAILY AS NEEDED FOR ANXIETY   Quantity:  20 tablet   Refills:  0            Where " to get your medicines      These medications were sent to [facility] [address] [place], [place] - [address] AT [facility] [address], [facility] [address]     Phone:  [phone]     citalopram 40 MG tablet         Some of these will need a paper prescription and others can be bought over the counter.  Ask your nurse if you have questions.     Bring a paper prescription for each of these medications     LORazepam 0.5 MG tablet                Primary Care Provider Office Phone # Fax #    Yusuf Doshi [phone] [address] [facility] [address]  [facility] [address]        Equal Access to Services     LUDY HICKEY : Hadii sacha barrera hadasho Sojethroali, waaxda luqadaha, qaybta kaalmada adeegyada, orlando steele . So [facility] [phone].    ATENCIÓN: Si habla español, tiene a grant disposición servicios gratuitos de asistencia lingüística. [facility] [phone].    We comply with applicable federal civil rights laws and [place] laws. We do not discriminate on the basis of race, color, national origin, age, disability sex, sexual orientation or gender identity.            Thank you!     Thank you for choosing [facility]  for your care. Our goal is always to provide you with excellent care. Hearing back from our patients is one way we can continue to improve our services. Please take a few minutes to complete the written survey that you may receive in the mail after your visit with us. Thank you!             Your Updated Medication List - Protect others around you: Learn how to safely use, store and throw away your medicines at www.disposemymeds.org.          This list is accurate as of: 9/19/17 11:59 PM.  Always use your most recent med list.                   Brand Name Dispense Instructions for use Diagnosis    citalopram 40 MG tablet    celeXA    90 tablet    Take 1 tablet (40 mg) by mouth daily    Depression with anxiety       LORazepam  0.5 MG tablet    ATIVAN    20 tablet    TAKE 1 TO 2 TABLET BY MOUTH TWICE DAILY AS NEEDED FOR ANXIETY    Depression with anxiety       MINOCIN PO      Take by mouth 2 times daily        SPIRONOLACTONE PO      Take by mouth 2 times daily

## 2017-09-19 NOTE — NURSING NOTE
"Chief Complaint   Patient presents with     Recheck Medication       Initial BP (!) 87/61 (BP Location: Left arm, Patient Position: Chair, Cuff Size: Adult Large)  Pulse 69  Temp 97.6  F (36.4  C) (Oral)  Ht 5' 7\" (1.702 m)  Wt 196 lb 3.2 oz (89 kg)  SpO2 98%  Breastfeeding? No  BMI 30.73 kg/m2 Estimated body mass index is 30.73 kg/(m^2) as calculated from the following:    Height as of this encounter: 5' 7\" (1.702 m).    Weight as of this encounter: 196 lb 3.2 oz (89 kg).  Medication Reconciliation: complete.  Priyanka Dorado CMA    "

## 2017-09-19 NOTE — PROGRESS NOTES
SUBJECTIVE:   Suzie Castillo is a 37 year old female who presents to clinic today for the following health issues:    Mrs. Castillo has a history of anxiety and depression ans returns for follow up. Patient notes anxiety attacks have decreased to twice a week with the last episode 2 nights ago. She notes panic attacks happen just before falling asleep. She finds help falling asleep with melatonin and valerian root. Patient also finds yoga, relaxing baths and meditation helps with anxiety. She has started talk therapy and other exercises for stress relief. Patient rates her current mood is a 5/10 on a scale where 1 is life threatening. At last months appointment she states her mood was a 3/10. While beginning Citalopram, she notes loss of appetite which has since improved. Patient wonders about potential taper. Denies lightheadedness. Patient is experiencing lack of concentration though symptoms have been improving.    Problem list and histories reviewed & adjusted, as indicated.  Additional history: as documented    Current Outpatient Prescriptions   Medication Sig Dispense Refill     citalopram (CELEXA) 40 MG tablet TAKE 1 TABLET BY MOUTH EVERY DAY 30 tablet 0     LORazepam (ATIVAN) 0.5 MG tablet TAKE 1 TO 2 TABLET BY MOUTH TWICE DAILY AS NEEDED FOR ANXIETY 20 tablet 0     Minocycline HCl (MINOCIN PO) Take by mouth 2 times daily       SPIRONOLACTONE PO Take by mouth 2 times daily       No Known Allergies    Reviewed and updated as needed this visit by clinical staff     Reviewed and updated as needed this visit by Provider         ROS:  Constitutional, HEENT, cardiovascular, pulmonary, gi and gu systems are negative, except as otherwise noted.    This document serves as a record of the services and decisions personally performed and made by Yusuf Doshi MD. It was created on his/her behalf by Nela Guzman, a trained medical scribe. The creation of this document is based the provider's statements to  "the medical scribe.  Scribcindy Nela Guzman 6:13 PM, September 19, 2017     OBJECTIVE:   BP (!) 87/61 (BP Location: Left arm, Patient Position: Chair, Cuff Size: Adult Large)  Pulse 69  Temp 97.6  F (36.4  C) (Oral)  Ht 1.702 m (5' 7\")  Wt 89 kg (196 lb 3.2 oz)  SpO2 98%  Breastfeeding? No  BMI 30.73 kg/m2  Body mass index is 30.73 kg/(m^2).  Well nourished, alert adult, under no apparent distress.    ASSESSMENT/PLAN:   Depression with anxiety  Patient has seen improvement with current medication and therapies. Continue weekly therapy in conjuncture with daily citalopram and as needed Ativan. Return in 3 months for follow up. We discussed taper once symptoms have completely resolved, except for significant trigger, and her mood has remained stable.  - citalopram (CELEXA) 40 MG tablet  Dispense: 90 tablet; Refill: 3  - LORazepam (ATIVAN) 0.5 MG tablet  Dispense: 20 tablet; Refill: 0    Need for prophylactic vaccination and inoculation against influenza  - FLU VAC, SPLIT VIRUS IM > 3 YO (QUADRIVALENT) [07922]  - Vaccine Administration, Initial [55133]    Yusuf Doshi MD  [facility]    The information in this document, created by the medical scribe for me, accurately reflects the services I personally performed and the decisions made by me. I have reviewed and approved this document for accuracy prior to leaving the patient care area.  Yusuf Doshi MD  6:10 PM, 09/19/17     "

## 2017-09-19 NOTE — PROGRESS NOTES
Injectable Influenza Immunization Documentation    1.  Is the person to be vaccinated sick today?   No    2. Does the person to be vaccinated have an allergy to a component   of the vaccine?   No    3. Has the person to be vaccinated ever had a serious reaction   to influenza vaccine in the past?   No    4. Has the person to be vaccinated ever had Guillain-Barré syndrome?   No    Form completed by Priyanka Dorado CMA

## 2017-09-20 ENCOUNTER — OFFICE VISIT (OUTPATIENT)
Dept: PSYCHOLOGY | Facility: CLINIC | Age: 37
End: 2017-09-20
Payer: COMMERCIAL

## 2017-09-20 DIAGNOSIS — F43.23 ADJUSTMENT DISORDER WITH MIXED ANXIETY AND DEPRESSED MOOD: Primary | ICD-10-CM

## 2017-09-20 PROCEDURE — 90834 PSYTX W PT 45 MINUTES: CPT | Performed by: SOCIAL WORKER

## 2017-09-20 ASSESSMENT — ANXIETY QUESTIONNAIRES: GAD7 TOTAL SCORE: 8

## 2017-09-20 NOTE — MR AVS SNAPSHOT
"                  MRN:[id number]                      After Visit Summary   2017    Suzie Castillo    MRN: [id number]           Visit Information        Provider Department      2017 4:00 PM Katharina Reyez, [facility] Generic      Your next 10 appointments already scheduled     Oct 04, 2017 11:00 AM CDT   Return Visit with Katharina Reyez [facility] ([facility])    [address]  [facility] [address]   [phone]            Oct 17, 2017  4:00 PM CDT   Return Visit with Katharina Reyez [facility] ([facility] [place])    [address]  [facility] [address] [phone]              MyChart Information     [email or web address] lets you send messages to your doctor, view your test results, renew your prescriptions, schedule appointments and more. To sign up, go to [email or web address] . Click on \"Log in\" on the left side of the screen, which will take you to the Welcome page. Then click on \"Sign up Now\" on the right side of the page.     You will be asked to enter the access code listed below, as well as some personal information. Please follow the directions to create your username and password.     Your access code is: [email or web address]  Expires: 10/17/2017  1:13 PM     Your access code will  in 90 days. If you need help or a new code, please call your [place] clinic or [phone].        Care EveryWhere ID     This is your Care EveryWhere ID. This could be used by other organizations to access your [place] medical records  [id number]        Equal Access to Services     NGUYEN HICKEY AH: Hadii sacha Hein, waaxda luqadaha, qaybta kaalmada adejoy, orlando pichardo. So [facility] [phone].    ATENCIÓN: Si habla español, tiene a grant disposición servicios gratuitos de asistencia lingüística. Llame al [phone].    We comply with applicable federal civil " rights laws and [place] laws. We do not discriminate on the basis of race, color, national origin, age, disability sex, sexual orientation or gender identity.

## 2017-09-21 DIAGNOSIS — F41.8 DEPRESSION WITH ANXIETY: ICD-10-CM

## 2017-09-21 ASSESSMENT — PATIENT HEALTH QUESTIONNAIRE - PHQ9: SUM OF ALL RESPONSES TO PHQ QUESTIONS 1-9: 10

## 2017-09-21 NOTE — TELEPHONE ENCOUNTER
Refill not needed, medication started 9/19/17      citalopram (CELEXA) 40 MG tablet      Last Written Prescription Date: 9/19/17  Last Fill Quantity: 90,  # refills: 3   Last Office Visit with [facility], P or [facility] prescribing provider: 9/19/17                                             Ailyn MISHRA)
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
## 2017-09-22 RX ORDER — CITALOPRAM HYDROBROMIDE 40 MG/1
TABLET ORAL
Qty: 30 TABLET | Refills: 0 | OUTPATIENT
Start: 2017-09-22

## 2017-09-22 NOTE — TELEPHONE ENCOUNTER
Script was just filled on 9/19 for one year supply to correct pharmacy. Denial sent with note to pharmacy requesting they refill.     Aria Cornell RN

## 2017-10-04 ENCOUNTER — OFFICE VISIT (OUTPATIENT)
Dept: PSYCHOLOGY | Facility: CLINIC | Age: 37
End: 2017-10-04
Payer: COMMERCIAL

## 2017-10-04 DIAGNOSIS — F43.23 ADJUSTMENT DISORDER WITH MIXED ANXIETY AND DEPRESSED MOOD: Primary | ICD-10-CM

## 2017-10-04 PROCEDURE — 90834 PSYTX W PT 45 MINUTES: CPT | Performed by: SOCIAL WORKER

## 2017-10-04 ASSESSMENT — PATIENT HEALTH QUESTIONNAIRE - PHQ9
SUM OF ALL RESPONSES TO PHQ QUESTIONS 1-9: 9
5. POOR APPETITE OR OVEREATING: SEVERAL DAYS

## 2017-10-04 ASSESSMENT — ANXIETY QUESTIONNAIRES
5. BEING SO RESTLESS THAT IT IS HARD TO SIT STILL: NOT AT ALL
7. FEELING AFRAID AS IF SOMETHING AWFUL MIGHT HAPPEN: SEVERAL DAYS
6. BECOMING EASILY ANNOYED OR IRRITABLE: SEVERAL DAYS
2. NOT BEING ABLE TO STOP OR CONTROL WORRYING: SEVERAL DAYS
1. FEELING NERVOUS, ANXIOUS, OR ON EDGE: SEVERAL DAYS
GAD7 TOTAL SCORE: 6
IF YOU CHECKED OFF ANY PROBLEMS ON THIS QUESTIONNAIRE, HOW DIFFICULT HAVE THESE PROBLEMS MADE IT FOR YOU TO DO YOUR WORK, TAKE CARE OF THINGS AT HOME, OR GET ALONG WITH OTHER PEOPLE: SOMEWHAT DIFFICULT
3. WORRYING TOO MUCH ABOUT DIFFERENT THINGS: SEVERAL DAYS

## 2017-10-04 NOTE — MR AVS SNAPSHOT
"                  MRN:[id number]                      After Visit Summary   10/4/2017    Suzie Castillo    MRN: [id number]           Visit Information        Provider Department      10/4/2017 11:00 AM Katharina Reyez, [facility] Generic      Your next 10 appointments already scheduled     Oct 17, 2017  4:00 PM CDT   Return Visit with Katharina Reyez [facility] ([facility])    [address]  [facility] [address]   [phone]            2017 11:00 AM CDT   Return Visit with Katharina Reyez [facility] ([facility])    [address]  [facility] [address]   [phone]              MyChart Information     [employer] lets you send messages to your doctor, view your test results, renew your prescriptions, schedule appointments and more. To sign up, go to [email or web address] . Click on \"Log in\" on the left side of the screen, which will take you to the Welcome page. Then click on \"Sign up Now\" on the right side of the page.     You will be asked to enter the access code listed below, as well as some personal information. Please follow the directions to create your username and password.     Your access code is: [email or web address]  Expires: 10/17/2017  1:13 PM     Your access code will  in 90 days. If you need help or a new code, please call your [place] clinic or [phone].        Care EveryWhere ID     This is your Care EveryWhere ID. This could be used by other organizations to access your [place] medical records  [id number]        Equal Access to Services     NGUYEN HICKEY AH: Hadii sacha Hein, waaxda luqadaha, qaybta kaalmada adeartieyada, orlando pichardo. So [facility] [phone].    ATENCIÓN: Si habla español, tiene a grant disposición servicios gratuitos de asistencia lingüística. Llame al [phone].    We comply with applicable federal civil " rights laws and [place] laws. We do not discriminate on the basis of race, color, national origin, age, disability, sex, sexual orientation, or gender identity.
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
## 2017-10-05 ASSESSMENT — ANXIETY QUESTIONNAIRES: GAD7 TOTAL SCORE: 6

## 2017-10-17 ENCOUNTER — OFFICE VISIT (OUTPATIENT)
Dept: PSYCHOLOGY | Facility: CLINIC | Age: 37
End: 2017-10-17
Payer: COMMERCIAL

## 2017-10-17 DIAGNOSIS — F43.23 ADJUSTMENT DISORDER WITH MIXED ANXIETY AND DEPRESSED MOOD: Primary | ICD-10-CM

## 2017-10-17 PROCEDURE — 90834 PSYTX W PT 45 MINUTES: CPT | Performed by: SOCIAL WORKER

## 2017-10-17 NOTE — MR AVS SNAPSHOT
"                  MRN:[id number]                      After Visit Summary   10/17/2017    Suzie Castillo    MRN: [id number]           Visit Information        Provider Department      10/17/2017 4:00 PM Dereje KatharinaMIS [facility] Generic      Your next 10 appointments already scheduled     2017 11:00 AM CDT   Return Visit with Katharina ReyezMIS   [facility] ([facility])    [address]  [facility] [address] [phone]              MyChart Information     [facility] lets you send messages to your doctor, view your test results, renew your prescriptions, schedule appointments and more. To sign up, go to [email or web address] . Click on \"Log in\" on the left side of the screen, which will take you to the Welcome page. Then click on \"Sign up Now\" on the right side of the page.     You will be asked to enter the access code listed below, as well as some personal information. Please follow the directions to create your username and password.     Your access code is: [email or web address]  Expires: 2018 10:58 AM     Your access code will  in 90 days. If you need help or a new code, please call your [place] clinic or [phone].        Care EveryWhere ID     This is your Care EveryWhere ID. This could be used by other organizations to access your [place] medical records  [id number]        Equal Access to Services     NGUYEN HICKEY : Hadii sacha barrera hadasho Sojethroali, waaxda luqadaha, qaybta kaalmada adeegyada, orlando steele . So [facility] [phone].    ATENCIÓN: Si habla español, tiene a grant disposición servicios gratuitos de asistencia lingüística. Llame al [phone].    We comply with applicable federal civil rights laws and [place] laws. We do not discriminate on the basis of race, color, national origin, age, disability, sex, sexual orientation, or gender identity.            "

## 2017-10-18 ASSESSMENT — ANXIETY QUESTIONNAIRES
2. NOT BEING ABLE TO STOP OR CONTROL WORRYING: MORE THAN HALF THE DAYS
GAD7 TOTAL SCORE: 10
7. FEELING AFRAID AS IF SOMETHING AWFUL MIGHT HAPPEN: NEARLY EVERY DAY
1. FEELING NERVOUS, ANXIOUS, OR ON EDGE: MORE THAN HALF THE DAYS
6. BECOMING EASILY ANNOYED OR IRRITABLE: SEVERAL DAYS
3. WORRYING TOO MUCH ABOUT DIFFERENT THINGS: SEVERAL DAYS
5. BEING SO RESTLESS THAT IT IS HARD TO SIT STILL: NOT AT ALL

## 2017-10-18 ASSESSMENT — PATIENT HEALTH QUESTIONNAIRE - PHQ9
5. POOR APPETITE OR OVEREATING: SEVERAL DAYS
SUM OF ALL RESPONSES TO PHQ QUESTIONS 1-9: 12
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
## 2017-10-19 ASSESSMENT — ANXIETY QUESTIONNAIRES: GAD7 TOTAL SCORE: 10

## 2017-11-01 ENCOUNTER — TELEPHONE (OUTPATIENT)
Dept: PSYCHOLOGY | Facility: CLINIC | Age: 37
End: 2017-11-01

## 2017-11-02 ENCOUNTER — TELEPHONE (OUTPATIENT)
Dept: FAMILY MEDICINE | Facility: CLINIC | Age: 37
End: 2017-11-02

## 2017-11-02 NOTE — TELEPHONE ENCOUNTER
Reason for Call:  Other     Detailed comments: Her employment needs verification  Of her illness    Phone Number Patient can be reached at: Home number on file [phone] (home)    Best Time: anytime    Can we leave a detailed message on this number? YES    Call taken on 11/2/2017 at 1:03 PM by Aissatou Juarez

## 2017-11-02 NOTE — TELEPHONE ENCOUNTER
Dr. Doshi,  I spoke to Pt. Pt  Needs a letter for unemployment stating the dates  That you saw and and also stating her chronic conditions.

## 2017-11-03 NOTE — TELEPHONE ENCOUNTER
To whom it may concern,I believe it would be in the patient's best interest to clarify her employment verification needs to return to clinic two complete the information to assist the patient as best possible. Please have the patient return to clinic for follow-up verification. If she's able to return to clinic we can certainly send her employer the progress notes.

## 2017-11-07 DIAGNOSIS — F41.8 DEPRESSION WITH ANXIETY: ICD-10-CM

## 2017-11-08 NOTE — TELEPHONE ENCOUNTER
LORazepam (ATIVAN) 0.5 MG tablet        Last Written Prescription Date: 9/19/2017  Last Fill Quantity: 20,  # refills: 0   Last Office Visit with [facility], [facility] or [facility] prescribing provider: 9/19/2017                                         Next 5 appointments (look out 90 days)     Nov 14, 2017  5:00 PM CST   Office Visit with Yusuf Doshi MD   [facility]    [address] [facility] [address]   [phone]

## 2017-11-13 RX ORDER — LORAZEPAM 0.5 MG/1
TABLET ORAL
Qty: 20 TABLET | Refills: 0 | Status: SHIPPED | OUTPATIENT
Start: 2017-11-13 | End: 2018-02-27

## 2017-11-14 ENCOUNTER — OFFICE VISIT (OUTPATIENT)
Dept: FAMILY MEDICINE | Facility: CLINIC | Age: 37
End: 2017-11-14
Payer: COMMERCIAL

## 2017-11-14 VITALS
HEIGHT: 67 IN | OXYGEN SATURATION: 98 % | WEIGHT: 196 LBS | SYSTOLIC BLOOD PRESSURE: 105 MMHG | HEART RATE: 80 BPM | BODY MASS INDEX: 30.76 KG/M2 | TEMPERATURE: 98 F | DIASTOLIC BLOOD PRESSURE: 66 MMHG

## 2017-11-14 DIAGNOSIS — F43.23 ADJUSTMENT DISORDER WITH MIXED ANXIETY AND DEPRESSED MOOD: Primary | ICD-10-CM

## 2017-11-14 PROCEDURE — 99213 OFFICE O/P EST LOW 20 MIN: CPT | Performed by: INTERNAL MEDICINE

## 2017-11-14 NOTE — NURSING NOTE
"Chief Complaint   Patient presents with     Forms       Initial /66 (BP Location: Right arm, Patient Position: Sitting, Cuff Size: Adult Regular)  Pulse 80  Temp 98  F (36.7  C) (Oral)  Ht 5' 7\" (1.702 m)  Wt 196 lb (88.9 kg)  SpO2 98%  Breastfeeding? No  BMI 30.7 kg/m2 Estimated body mass index is 30.7 kg/(m^2) as calculated from the following:    Height as of this encounter: 5' 7\" (1.702 m).    Weight as of this encounter: 196 lb (88.9 kg).  Medication Reconciliation: complete   Anais Minden- CMA      "

## 2017-11-14 NOTE — PROGRESS NOTES
SUBJECTIVE:   Suzie Castillo is a 37 year old female who presents to clinic today for the following health issues:    Patient notes she was ill several days and was unable to work on October 2nd and October 9th due to high levels of anxiety. Patient notes she was extremely stressed with work during that time period. She notes she woke during the night of October 1st with anxiety and was helped back to sleep with Ativan. She woke the next morning and vomited. She notes an subsequent anxiety attack on the evening of October 8th for which she took Ativan. She work the next morning, again, nauseous and vomited. Both days she felt it was best to stay home and manage her mental health.     She reports she attempts to control her anxiety with use of calming apps, chamomile tea, going to therapy and exercise. She reports she is Moravian about doing yoga after work though, she notices working out in the morning helps manage her stress level better throughout the day.     Problem list and histories reviewed & adjusted, as indicated.  Additional history: as documented    Current Outpatient Prescriptions   Medication Sig Dispense Refill     LORazepam (ATIVAN) 0.5 MG tablet TAKE 1-2 TABLETS BY MOUTH TWICE DAILY AS NEEDED FOR ANXIETY 20 tablet 0     citalopram (CELEXA) 40 MG tablet Take 1 tablet (40 mg) by mouth daily 90 tablet 3     Minocycline HCl (MINOCIN PO) Take by mouth 2 times daily       SPIRONOLACTONE PO Take by mouth 2 times daily       No Known Allergies    Reviewed and updated as needed this visit by clinical staff  Tobacco  Allergies  Soc Hx      Reviewed and updated as needed this visit by Provider       ROS:  Constitutional, HEENT, cardiovascular, pulmonary, gi and gu systems are negative, except as otherwise noted.    This document serves as a record of the services and decisions personally performed and made by Yusuf Doshi MD. It was created on his/her behalf by Nela Guzman, a trained medical  "scribe. The creation of this document is based the provider's statements to the medical scribe.  Lance Nela Guzman 5:19 PM, November 14, 2017     OBJECTIVE:   /66 (BP Location: Right arm, Patient Position: Sitting, Cuff Size: Adult Regular)  Pulse 80  Temp 98  F (36.7  C) (Oral)  Ht 1.702 m (5' 7\")  Wt 88.9 kg (196 lb)  SpO2 98%  Breastfeeding? No  BMI 30.7 kg/m2  Body mass index is 30.7 kg/(m^2).  Letter written for insurance company.    ASSESSMENT/PLAN:   1. Adjustment disorder with mixed anxiety and depressed mood  Discussed in detail possible outlets for stress to help reduce anxiety. Patient will begin working on stress relief earlier in the day with aim of avoiding becoming overwhelmed in the evening, triggering an anxiety attack.     Total time face to face on the above problems was 17 minutes. More than 50% time spent counseling.    Yusuf Doshi MD  [facility]     The information in this document, created by the medical scribe for me, accurately reflects the services I personally performed and the decisions made by me. I have reviewed and approved this document for accuracy prior to leaving the patient care area.  Yusuf Doshi MD  5:15 PM, 11/14/17     "

## 2017-11-14 NOTE — MR AVS SNAPSHOT
After Visit Summary   11/14/2017    Suzie Castillo    MRN: [id number]           Patient Information     Date Of Birth          1980        Visit Information        Provider Department      11/14/2017 5:00 PM Yusuf Doshi MD [facility]        Today's Diagnoses     Adjustment disorder with mixed anxiety and depressed mood    -  1       Follow-ups after your visit        Your next 10 appointments already scheduled     Dec 12, 2017  2:00 PM CST   Return Visit with Katharina Reyez, [facility] [place] ([facility])    [address] [place] [address] [address]  [place] [address]   [phone]            Juan 10, 2018  5:00 PM CST   Return Visit with Katharina Reyez [facility] ([facility] [place])    [address] [place] [address] [address]  [place] [address]   [phone]            Jan 24, 2018  5:00 PM CST   Return Visit with Katharina Reyez [facility] [place] ([facility])    [address] [place] [address] [address]  [place] [address]   [phone]              Who to contact     If you have questions or need follow up information about today's clinic visit or your schedule please contact [facility] directly at [phone].  Normal or non-critical lab and imaging results will be communicated to you by MyChart, letter or phone within 4 business days after the clinic has received the results. If you do not hear from us within 7 days, please contact the clinic through MyChart or phone. If you have a critical or abnormal lab result, we will notify you by phone as soon as possible.  Submit refill requests through [employer] or call your pharmacy and they will forward the refill request to us. Please allow 3 business days for your refill to be completed.          Additional Information About Your Visit        [employer] Mobile Information     App[employer]ifier lets you send messages to your doctor, view your test results, renew your prescriptions, schedule  "appointments and more. To sign up, go to [email or web address] . Click on \"Log in\" on the left side of the screen, which will take you to the Welcome page. Then click on \"Sign up Now\" on the right side of the page.     You will be asked to enter the access code listed below, as well as some personal information. Please follow the directions to create your username and password.     Your access code is: [email or web address]  Expires: 2018  9:58 AM     Your access code will  in 90 days. If you need help or a new code, please call your [facility] clinic or [phone].        Care EveryWhere ID     This is your Care EveryWhere ID. This could be used by other organizations to access your [facility] medical records  [id number]        Your Vitals Were     Pulse Temperature Height Pulse Oximetry Breastfeeding? BMI (Body Mass Index)    80 98  F (36.7  C) (Oral) 5' 7\" (1.702 m) 98% No 30.7 kg/m2       Blood Pressure from Last 3 Encounters:   17 105/66   17 96/66   17 101/66    Weight from Last 3 Encounters:   17 196 lb (88.9 kg)   17 196 lb 3.2 oz (89 kg)   17 199 lb 12.8 oz (90.6 kg)              Today, you had the following     No orders found for display       Primary Care Provider Office Phone # Fax #    Yusuf Doshi [phone] [address]  [facility] [address]        Equal Access to Services     LUDY HICKEY : Hadii sacha Hein, aftab hunter, orlando kulkrani. So [facility] [phone].    ATENCIÓN: Si habla español, tiene a grant disposición servicios gratuitos de asistencia lingüística. Tiffany al [phone].    We comply with applicable federal civil rights laws and [place] laws. We do not discriminate on the basis of race, color, national origin, age, disability, sex, sexual orientation, or gender identity.            Thank you!     Thank you for choosing [facility] "  for your care. Our goal is always to provide you with excellent care. Hearing back from our patients is one way we can continue to improve our services. Please take a few minutes to complete the written survey that you may receive in the mail after your visit with us. Thank you!             Your Updated Medication List - Protect others around you: Learn how to safely use, store and throw away your medicines at www.disposemymeds.org.          This list is accurate as of: 11/14/17 11:59 PM.  Always use your most recent med list.                   Brand Name Dispense Instructions for use Diagnosis    citalopram 40 MG tablet    celeXA    90 tablet    Take 1 tablet (40 mg) by mouth daily    Depression with anxiety       LORazepam 0.5 MG tablet    ATIVAN    20 tablet    TAKE 1-2 TABLETS BY MOUTH TWICE DAILY AS NEEDED FOR ANXIETY    Depression with anxiety       MINOCIN PO      Take by mouth 2 times daily        SPIRONOLACTONE PO      Take by mouth 2 times daily
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
## 2017-11-20 ENCOUNTER — TELEPHONE (OUTPATIENT)
Dept: FAMILY MEDICINE | Facility: CLINIC | Age: 37
End: 2017-11-20

## 2017-11-20 NOTE — TELEPHONE ENCOUNTER
Reason for Call:  Note    Detailed comments: Patient got a note from when she was at her appt. But she lost  them    Phone Number Patient can be reached at: Other phone number:  disregard    Best Time: she will come in and pick it up    Can we leave a detailed message on this number? NO    Call taken on 11/20/2017 at 3:55 PM by Jhonatan Azevedo

## 2017-12-06 ENCOUNTER — OFFICE VISIT (OUTPATIENT)
Dept: PSYCHOLOGY | Facility: CLINIC | Age: 37
End: 2017-12-06
Payer: COMMERCIAL

## 2017-12-06 DIAGNOSIS — F43.23 ADJUSTMENT DISORDER WITH MIXED ANXIETY AND DEPRESSED MOOD: Primary | ICD-10-CM

## 2017-12-06 PROCEDURE — 90834 PSYTX W PT 45 MINUTES: CPT | Performed by: SOCIAL WORKER

## 2017-12-06 NOTE — MR AVS SNAPSHOT
"                  MRN:[id number]                      After Visit Summary   2017    Suzie Castillo    MRN: [id number]           Visit Information        Provider Department      2017 4:00 PM Katharina Reyez, [facility]      Your next 10 appointments already scheduled     Dec 12, 2017  2:00 PM CST   Return Visit with Katharina Reyez [facility] [place] ([facility] [place])    [address]  [place] [address]   [phone]            Juan 10, 2018  5:00 PM CST   Return Visit with Katharina Reyez [facility] [place] ([facility] [place])    [address]  [place] [address]   [phone]            2018  5:00 PM CST   Return Visit with Katharina Reyez [facility] [place] ([facility] [place])    [address]  [place] [address]   [phone]              MyChart Information     [employer] lets you send messages to your doctor, view your test results, renew your prescriptions, schedule appointments and more. To sign up, go to [email or web address] . Click on \"Log in\" on the left side of the screen, which will take you to the Welcome page. Then click on \"Sign up Now\" on the right side of the page.     You will be asked to enter the access code listed below, as well as some personal information. Please follow the directions to create your username and password.     Your access code is: [email or web address]  Expires: 2018  9:58 AM     Your access code will  in 90 days. If you need help or a new code, please call your [facility] clinic or [phone].        Care EveryWhere ID     This is your Care EveryWhere ID. This could be used by other organizations to access your [facility] medical records  [id number]        Equal Access to Services     LUDY HICKEY AH: Filiberto Hein, aftab hunter, orlando kulkarniarash la'aan ah. " So [facility] [phone].    ATENCIÓN: Si habla español, tiene a grant disposición servicios gratuitos de asistencia lingüística. Llame al [phone].    We comply with applicable federal civil rights laws and [place] laws. We do not discriminate on the basis of race, color, national origin, age, disability, sex, sexual orientation, or gender identity.

## 2017-12-07 ASSESSMENT — ANXIETY QUESTIONNAIRES
5. BEING SO RESTLESS THAT IT IS HARD TO SIT STILL: SEVERAL DAYS
3. WORRYING TOO MUCH ABOUT DIFFERENT THINGS: NEARLY EVERY DAY
7. FEELING AFRAID AS IF SOMETHING AWFUL MIGHT HAPPEN: NEARLY EVERY DAY
GAD7 TOTAL SCORE: 16
2. NOT BEING ABLE TO STOP OR CONTROL WORRYING: NEARLY EVERY DAY
1. FEELING NERVOUS, ANXIOUS, OR ON EDGE: NEARLY EVERY DAY
IF YOU CHECKED OFF ANY PROBLEMS ON THIS QUESTIONNAIRE, HOW DIFFICULT HAVE THESE PROBLEMS MADE IT FOR YOU TO DO YOUR WORK, TAKE CARE OF THINGS AT HOME, OR GET ALONG WITH OTHER PEOPLE: SOMEWHAT DIFFICULT
6. BECOMING EASILY ANNOYED OR IRRITABLE: SEVERAL DAYS

## 2017-12-07 ASSESSMENT — PATIENT HEALTH QUESTIONNAIRE - PHQ9
SUM OF ALL RESPONSES TO PHQ QUESTIONS 1-9: 13
5. POOR APPETITE OR OVEREATING: MORE THAN HALF THE DAYS
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
## 2017-12-08 ASSESSMENT — ANXIETY QUESTIONNAIRES: GAD7 TOTAL SCORE: 16

## 2017-12-12 ENCOUNTER — OFFICE VISIT (OUTPATIENT)
Dept: PSYCHOLOGY | Facility: CLINIC | Age: 37
End: 2017-12-12
Payer: COMMERCIAL

## 2017-12-12 DIAGNOSIS — F43.23 ADJUSTMENT DISORDER WITH MIXED ANXIETY AND DEPRESSED MOOD: Primary | ICD-10-CM

## 2017-12-12 PROCEDURE — 90834 PSYTX W PT 45 MINUTES: CPT | Performed by: SOCIAL WORKER

## 2017-12-12 ASSESSMENT — PATIENT HEALTH QUESTIONNAIRE - PHQ9
SUM OF ALL RESPONSES TO PHQ QUESTIONS 1-9: 13
5. POOR APPETITE OR OVEREATING: SEVERAL DAYS

## 2017-12-12 ASSESSMENT — ANXIETY QUESTIONNAIRES
GAD7 TOTAL SCORE: 11
IF YOU CHECKED OFF ANY PROBLEMS ON THIS QUESTIONNAIRE, HOW DIFFICULT HAVE THESE PROBLEMS MADE IT FOR YOU TO DO YOUR WORK, TAKE CARE OF THINGS AT HOME, OR GET ALONG WITH OTHER PEOPLE: SOMEWHAT DIFFICULT
3. WORRYING TOO MUCH ABOUT DIFFERENT THINGS: MORE THAN HALF THE DAYS
7. FEELING AFRAID AS IF SOMETHING AWFUL MIGHT HAPPEN: NEARLY EVERY DAY
2. NOT BEING ABLE TO STOP OR CONTROL WORRYING: NEARLY EVERY DAY
6. BECOMING EASILY ANNOYED OR IRRITABLE: SEVERAL DAYS
5. BEING SO RESTLESS THAT IT IS HARD TO SIT STILL: NOT AT ALL
1. FEELING NERVOUS, ANXIOUS, OR ON EDGE: SEVERAL DAYS

## 2017-12-12 NOTE — MR AVS SNAPSHOT
"                  MRN:[id number]                      After Visit Summary   2017    Suzie Castillo    MRN: [id number]           Visit Information        Provider Department      2017 2:00 PM Katharina Reyez, [facility] Generic      Your next 10 appointments already scheduled     Juan 10, 2018  5:00 PM CST   Return Visit with Katharina Reyez [facility] ([facility])    [address]  [facility] [address]   [phone]            2018  5:00 PM CST   Return Visit with Katharina Reyez [facility] (formerly [facility])    [address]  [facility] [address] [phone]              MyChart Information     [employer] lets you send messages to your doctor, view your test results, renew your prescriptions, schedule appointments and more. To sign up, go to [email or web address] . Click on \"Log in\" on the left side of the screen, which will take you to the Welcome page. Then click on \"Sign up Now\" on the right side of the page.     You will be asked to enter the access code listed below, as well as some personal information. Please follow the directions to create your username and password.     Your access code is: [email or web address]  Expires: 2018  9:58 AM     Your access code will  in 90 days. If you need help or a new code, please call your [place] clinic or [phone].        Care EveryWhere ID     This is your Care EveryWhere ID. This could be used by other organizations to access your [place] medical records  [id number]        Equal Access to Services     NGUYEN HICKEY AH: Hadii sacha Hein, wafridada luqadaha, qaybta kaalmada adeartieyada, orlando pichardo. So [facility] [phone].    ATENCIÓN: Si habla español, tiene a grant disposición servicios gratuitos de asistencia lingüística. Llame al [phone].    We comply with applicable federal civil " rights laws and [place] laws. We do not discriminate on the basis of race, color, national origin, age, disability, sex, sexual orientation, or gender identity.
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
## 2017-12-13 ASSESSMENT — ANXIETY QUESTIONNAIRES: GAD7 TOTAL SCORE: 11

## 2018-01-24 ENCOUNTER — OFFICE VISIT (OUTPATIENT)
Dept: PSYCHOLOGY | Facility: CLINIC | Age: 38
End: 2018-01-24
Payer: COMMERCIAL

## 2018-01-24 DIAGNOSIS — F43.23 ADJUSTMENT DISORDER WITH MIXED ANXIETY AND DEPRESSED MOOD: Primary | ICD-10-CM

## 2018-01-24 PROCEDURE — 90834 PSYTX W PT 45 MINUTES: CPT | Performed by: SOCIAL WORKER

## 2018-01-24 NOTE — MR AVS SNAPSHOT
"                  MRN:[id number]                      After Visit Summary   2018    Suzie Castillo    MRN: [id number]           Visit Information        Provider Department      2018 5:00 PM Katharina Reyez, [facility] Generic      Your next 10 appointments already scheduled     2018  5:00 PM CST   Return Visit with Katharina Reyez [facility] ([facility])    [address]  [facility] [address] [phone]            Mar 07, 2018  5:00 PM CST   Return Visit with Katharina Reyez [facility] ([facility])    [address]  [facility] [address] [phone]              MyChart Information     [employer] lets you send messages to your doctor, view your test results, renew your prescriptions, schedule appointments and more. To sign up, go to [email or web address] . Click on \"Log in\" on the left side of the screen, which will take you to the Welcome page. Then click on \"Sign up Now\" on the right side of the page.     You will be asked to enter the access code listed below, as well as some personal information. Please follow the directions to create your username and password.     Your access code is: [email or web address]  Expires: 2018  1:03 PM     Your access code will  in 90 days. If you need help or a new code, please call your [place] clinic or [phone].        Care EveryWhere ID     This is your Care EveryWhere ID. This could be used by other organizations to access your [place] medical records  [id number]        Equal Access to Services     LUDY HICKEY AH: Hadii sacha Hein, wafridada lurayrayadaha, qaybta kaalmada berkley, orlando pichardo. So [facility] [phone].    ATENCIÓN: Si habla español, tiene a grant disposición servicios gratuitos de asistencia lingüística. Llame al [phone].    We comply with applicable federal civil rights " laws and [place] laws. We do not discriminate on the basis of race, color, national origin, age, disability, sex, sexual orientation, or gender identity.

## 2018-01-25 ASSESSMENT — ANXIETY QUESTIONNAIRES
2. NOT BEING ABLE TO STOP OR CONTROL WORRYING: SEVERAL DAYS
IF YOU CHECKED OFF ANY PROBLEMS ON THIS QUESTIONNAIRE, HOW DIFFICULT HAVE THESE PROBLEMS MADE IT FOR YOU TO DO YOUR WORK, TAKE CARE OF THINGS AT HOME, OR GET ALONG WITH OTHER PEOPLE: SOMEWHAT DIFFICULT
GAD7 TOTAL SCORE: 9
6. BECOMING EASILY ANNOYED OR IRRITABLE: SEVERAL DAYS
1. FEELING NERVOUS, ANXIOUS, OR ON EDGE: SEVERAL DAYS
7. FEELING AFRAID AS IF SOMETHING AWFUL MIGHT HAPPEN: SEVERAL DAYS
3. WORRYING TOO MUCH ABOUT DIFFERENT THINGS: SEVERAL DAYS
5. BEING SO RESTLESS THAT IT IS HARD TO SIT STILL: MORE THAN HALF THE DAYS

## 2018-01-25 ASSESSMENT — PATIENT HEALTH QUESTIONNAIRE - PHQ9: 5. POOR APPETITE OR OVEREATING: MORE THAN HALF THE DAYS
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
## 2018-01-26 ASSESSMENT — PATIENT HEALTH QUESTIONNAIRE - PHQ9: SUM OF ALL RESPONSES TO PHQ QUESTIONS 1-9: 10

## 2018-01-26 ASSESSMENT — ANXIETY QUESTIONNAIRES: GAD7 TOTAL SCORE: 9

## 2018-02-14 ENCOUNTER — OFFICE VISIT (OUTPATIENT)
Dept: PSYCHOLOGY | Facility: CLINIC | Age: 38
End: 2018-02-14
Payer: COMMERCIAL

## 2018-02-14 DIAGNOSIS — F43.23 ADJUSTMENT DISORDER WITH MIXED ANXIETY AND DEPRESSED MOOD: Primary | ICD-10-CM

## 2018-02-14 PROCEDURE — 90834 PSYTX W PT 45 MINUTES: CPT | Performed by: SOCIAL WORKER

## 2018-02-14 NOTE — MR AVS SNAPSHOT
"                  MRN:[id number]                      After Visit Summary   2018    Suzie Castillo    MRN: [id number]           Visit Information        Provider Department      2018 5:00 PM Dereje Katharina [facility] Generic      Your next 10 appointments already scheduled     2018  1:00 PM CST   Office Visit with [facility] ([facility])    [address] [facility] [address]   [phone]           Bring a current list of meds and any records pertaining to this visit. For Physicals, please bring immunization records and any forms needing to be filled out. Please arrive 10 minutes early to complete paperwork.            Mar 07, 2018  5:00 PM CST   Return Visit with Katharina Mckennarenée [facility] ([facility])    [address]  [facility] [address]   [phone]            Mar 28, 2018  5:00 PM CDT   Return Visit with Katharina Reyez [facility] ([facility])    [address]  [facility] [address]   [phone]              MyChart Information     [facility] lets you send messages to your doctor, view your test results, renew your prescriptions, schedule appointments and more. To sign up, go to [email or web address] . Click on \"Log in\" on the left side of the screen, which will take you to the Welcome page. Then click on \"Sign up Now\" on the right side of the page.     You will be asked to enter the access code listed below, as well as some personal information. Please follow the directions to create your username and password.     Your access code is: [email or web address]  Expires: 2018  1:03 PM     Your access code will  in 90 days. If you need help or a new code, please call your [facility] or [phone].        Care EveryWhere ID     This is your Care EveryWhere ID. This could be used by other " organizations to access your [place] medical records  [id number]        Equal Access to Services     LUDY HICKEY : Filiberto Hein, aftab hunter, orlando kulkarni. So [facility] [phone].    ATENCIÓN: Si habla español, tiene a grant disposición servicios gratuitos de asistencia lingüística. Llame al [phone].    We comply with applicable federal civil rights laws and [place] laws. We do not discriminate on the basis of race, color, national origin, age, disability, sex, sexual orientation, or gender identity.

## 2018-02-15 ASSESSMENT — ANXIETY QUESTIONNAIRES
6. BECOMING EASILY ANNOYED OR IRRITABLE: SEVERAL DAYS
2. NOT BEING ABLE TO STOP OR CONTROL WORRYING: SEVERAL DAYS
GAD7 TOTAL SCORE: 6
IF YOU CHECKED OFF ANY PROBLEMS ON THIS QUESTIONNAIRE, HOW DIFFICULT HAVE THESE PROBLEMS MADE IT FOR YOU TO DO YOUR WORK, TAKE CARE OF THINGS AT HOME, OR GET ALONG WITH OTHER PEOPLE: SOMEWHAT DIFFICULT
3. WORRYING TOO MUCH ABOUT DIFFERENT THINGS: SEVERAL DAYS
5. BEING SO RESTLESS THAT IT IS HARD TO SIT STILL: NOT AT ALL
1. FEELING NERVOUS, ANXIOUS, OR ON EDGE: MORE THAN HALF THE DAYS
7. FEELING AFRAID AS IF SOMETHING AWFUL MIGHT HAPPEN: NOT AT ALL

## 2018-02-15 ASSESSMENT — PATIENT HEALTH QUESTIONNAIRE - PHQ9: 5. POOR APPETITE OR OVEREATING: SEVERAL DAYS
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
## 2018-02-16 ENCOUNTER — OFFICE VISIT (OUTPATIENT)
Dept: FAMILY MEDICINE | Facility: CLINIC | Age: 38
End: 2018-02-16
Payer: COMMERCIAL

## 2018-02-16 VITALS
SYSTOLIC BLOOD PRESSURE: 101 MMHG | WEIGHT: 196 LBS | TEMPERATURE: 98.2 F | DIASTOLIC BLOOD PRESSURE: 63 MMHG | BODY MASS INDEX: 30.76 KG/M2 | HEART RATE: 66 BPM | HEIGHT: 67 IN | OXYGEN SATURATION: 100 %

## 2018-02-16 DIAGNOSIS — F41.8 DEPRESSION WITH ANXIETY: Primary | ICD-10-CM

## 2018-02-16 DIAGNOSIS — R53.83 FATIGUE, UNSPECIFIED TYPE: ICD-10-CM

## 2018-02-16 DIAGNOSIS — Z86.39 HISTORY OF HYPOTHYROIDISM: ICD-10-CM

## 2018-02-16 LAB — TSH SERPL DL<=0.005 MIU/L-ACNC: 2.02 MU/L (ref 0.4–4)

## 2018-02-16 PROCEDURE — 99213 OFFICE O/P EST LOW 20 MIN: CPT | Performed by: NURSE PRACTITIONER

## 2018-02-16 PROCEDURE — 36415 COLL VENOUS BLD VENIPUNCTURE: CPT | Performed by: NURSE PRACTITIONER

## 2018-02-16 PROCEDURE — 84443 ASSAY THYROID STIM HORMONE: CPT | Performed by: NURSE PRACTITIONER

## 2018-02-16 RX ORDER — CITALOPRAM HYDROBROMIDE 20 MG/1
20 TABLET ORAL DAILY
Qty: 90 TABLET | Refills: 1 | Status: SHIPPED | OUTPATIENT
Start: 2018-02-16 | End: 2019-02-13

## 2018-02-16 ASSESSMENT — ANXIETY QUESTIONNAIRES: GAD7 TOTAL SCORE: 6

## 2018-02-16 ASSESSMENT — PATIENT HEALTH QUESTIONNAIRE - PHQ9: SUM OF ALL RESPONSES TO PHQ QUESTIONS 1-9: 9

## 2018-02-16 NOTE — PROGRESS NOTES
HPI      SUBJECTIVE:   Suzie Castillo is a 37 year old female who presents to clinic today for the following health issues:    Dizziness      Duration:  Off and on for past month, worsening over the last 1-2 weeks    Description   Feeling faint:  YES  Feeling like the surroundings are moving: no   Loss of consciousness or falls: no     Intensity:  moderate, severe    Accompanying signs and symptoms:   Nausea/vomitting: YES- light nausea occasionally  Palpitations: no   Weakness in arms or legs: no   Vision or speech changes: YES - vision blurs, gets hazy and halos which is chronic since lasik   Ringing in ears (Tinnitus): no   Hearing loss related to dizziness: no   Other (fevers/chills/sweating/dyspnea): YES - short of breath sometimes - also causes possible panic attacks    History (similar episodes/head trauma/previous evaluation/recent bleeding): None    Precipitating or alleviating factors (new meds/chemicals): possibly related to Celexa  Worse with activity/head movement: no     Therapies tried and outcome: None      Lightheaded feeling daily for a couple weeks. Ok in the morning and worse as the day goes on   Everything is a little off and head pressure   If she gets into elevators she feels chacorta the is going to pass out   Cleaning last night got it       History of thyroid requiring meds for 4 years and has been off for over a year       No past medical history on file.  No past surgical history on file.  Social History   Substance Use Topics     Smoking status: Never Smoker     Smokeless tobacco: Never Used     Alcohol use Yes      Comment: rare social      Current Outpatient Prescriptions   Medication Sig Dispense Refill     citalopram (CELEXA) 20 MG tablet Take 1 tablet (20 mg) by mouth daily 90 tablet 1     LORazepam (ATIVAN) 0.5 MG tablet TAKE 1-2 TABLETS BY MOUTH TWICE DAILY AS NEEDED FOR ANXIETY 20 tablet 0     Minocycline HCl (MINOCIN PO) Take by mouth 2 times daily       SPIRONOLACTONE PO Take  "by mouth 2 times daily       [DISCONTINUED] citalopram (CELEXA) 40 MG tablet Take 1 tablet (40 mg) by mouth daily 90 tablet 3     No Known Allergies    Reviewed and updated as needed this visit by clinical staff and provider      ROS  Detailed as above       /63 (BP Location: Right arm, Cuff Size: Adult Large)  Pulse 66  Temp 98.2  F (36.8  C) (Tympanic)  Ht 5' 7\" (1.702 m)  Wt 196 lb (88.9 kg)  SpO2 100%  Breastfeeding? No  BMI 30.7 kg/m2      Physical Exam   Constitutional: She is well-developed, well-nourished, and in no distress.   HENT:   Head: Normocephalic.   Pulmonary/Chest: Effort normal.   Neurological: She is alert.   Skin: Skin is warm and dry.   Psychiatric: Mood and affect normal.   Vitals reviewed.      Assessment and Plan:       ICD-10-CM    1. Depression with anxiety F41.8 citalopram (CELEXA) 20 MG tablet   2. Fatigue, unspecified type R53.83 TSH with free T4 reflex   3. History of hypothyroidism Z86.39 TSH with free T4 reflex       I suspect her lightheadedness is r/t anxiety. She is starting a new job in the next couple weeks which is likely playing a role. She feels her life has greatly evened out and would like to try going down on her citalopram, which I feel is appropriately. She will start with cutting back to 20mg. If she gets any symptoms, she can go up to 30mg for 1-2 weeks then down again to 20mg.   Will check TSH considering her hx of hypothyroidism and persistent fatigue   Continue working with counselor   Call or rtc prn     The total visit time was 15 minutes more  than 50% was spent in counseling and coordination of care as discussed above.        Hazel Lunsford, APRN, CNP  [facility]      "

## 2018-02-16 NOTE — NURSING NOTE
"Chief Complaint   Patient presents with     Dizziness       Initial /63 (BP Location: Right arm, Cuff Size: Adult Large)  Pulse 66  Temp 98.2  F (36.8  C) (Tympanic)  Ht 5' 7\" (1.702 m)  Wt 196 lb (88.9 kg)  SpO2 100%  Breastfeeding? No  BMI 30.7 kg/m2 Estimated body mass index is 30.7 kg/(m^2) as calculated from the following:    Height as of this encounter: 5' 7\" (1.702 m).    Weight as of this encounter: 196 lb (88.9 kg).  Medication Reconciliation: complete   Lory Myers MA      "

## 2018-02-16 NOTE — MR AVS SNAPSHOT
"              After Visit Summary   2/16/2018    Suzie Castillo    MRN: [id number]           Patient Information     Date Of Birth          1980        Visit Information        Provider Department      2/16/2018 1:00 PM Hazel Lunsford APRN CNP [facility]        Today's Diagnoses     Depression with anxiety    -  1    Fatigue, unspecified type        History of hypothyroidism           Follow-ups after your visit        Your next 10 appointments already scheduled     Mar 07, 2018  5:00 PM CST   Return Visit with Katharina Reyez, [facility] [place] ([facility] [place])    [address] [place] [address] [address]  [place] [address] [phone]            Mar 28, 2018  5:00 PM CDT   Return Visit with Katharina Reyez [facility] [place] ([facility] [place])    [address]  [place] [address] [phone]              Who to contact     If you have questions or need follow up information about today's clinic visit or your schedule please contact [facility] directly at [phone].  Normal or non-critical lab and imaging results will be communicated to you by MyChart, letter or phone within 4 business days after the clinic has received the results. If you do not hear from us within 7 days, please contact the clinic through [employer] or phone. If you have a critical or abnormal lab result, we will notify you by phone as soon as possible.  Submit refill requests through [employer] or call your pharmacy and they will forward the refill request to us. Please allow 3 business days for your refill to be completed.          Additional Information About Your Visit        MyChart Information     [employer] lets you send messages to your doctor, view your test results, renew your prescriptions, schedule appointments and more. To sign up, go to [email or web address] . Click on \"Log in\" on the left side of the screen, which will take you to the Welcome page. Then " "click on \"Sign up Now\" on the right side of the page.     You will be asked to enter the access code listed below, as well as some personal information. Please follow the directions to create your username and password.     Your access code is: [email or web address]  Expires: 2018  1:03 PM     Your access code will  in 90 days. If you need help or a new code, please call your [place] clinic or [phone].        Care EveryWhere ID     This is your Care EveryWhere ID. This could be used by other organizations to access your [place] medical records  [id number]        Your Vitals Were     Pulse Temperature Height Pulse Oximetry Breastfeeding? BMI (Body Mass Index)    66 98.2  F (36.8  C) (Tympanic) 5' 7\" (1.702 m) 100% No 30.7 kg/m2       Blood Pressure from Last 3 Encounters:   18 101/63   17 105/66   17 96/66    Weight from Last 3 Encounters:   18 196 lb (88.9 kg)   17 196 lb (88.9 kg)   17 196 lb 3.2 oz (89 kg)              We Performed the Following     TSH with free T4 reflex          Today's Medication Changes          These changes are accurate as of 18  1:53 PM.  If you have any questions, ask your nurse or doctor.               These medicines have changed or have updated prescriptions.        Dose/Directions    citalopram 20 MG tablet   Commonly known as:  celeXA   This may have changed:    - medication strength  - how much to take   Used for:  Depression with anxiety   Changed by:  Hazel Lunsford APRN CNP        Dose:  20 mg   Take 1 tablet (20 mg) by mouth daily   Quantity:  90 tablet   Refills:  1            Where to get your medicines      These medications were sent to [facility] [address] [place] [address] AT [address] [address]    Hours:  24-hours Phone:  [phone]     citalopram 20 MG tablet                Primary Care Provider Office Phone # Fax #    Yusuf Doshi [phone] " [phone]       [address]  [facility] [address]        Equal Access to Services     LUDY HICKEY : Hadii sacha barrera arnoldoo Soromero, waaxda luqadaha, qaybta kaalmada berkley, orlando saidain hayaasam jacksnoartie aguilar laLeathapam pichardo. So [facility] [phone].    ATENCIÓN: Si habla español, tiene a grant disposición servicios gratuitos de asistencia lingüística. Llame al [phone].    We comply with applicable federal civil rights laws and [place] laws. We do not discriminate on the basis of race, color, national origin, age, disability, sex, sexual orientation, or gender identity.            Thank you!     Thank you for choosing [facility]  for your care. Our goal is always to provide you with excellent care. Hearing back from our patients is one way we can continue to improve our services. Please take a few minutes to complete the written survey that you may receive in the mail after your visit with us. Thank you!             Your Updated Medication List - Protect others around you: Learn how to safely use, store and throw away your medicines at www.disposemymeds.org.          This list is accurate as of 2/16/18  1:53 PM.  Always use your most recent med list.                   Brand Name Dispense Instructions for use Diagnosis    citalopram 20 MG tablet    celeXA    90 tablet    Take 1 tablet (20 mg) by mouth daily    Depression with anxiety       LORazepam 0.5 MG tablet    ATIVAN    20 tablet    TAKE 1-2 TABLETS BY MOUTH TWICE DAILY AS NEEDED FOR ANXIETY    Depression with anxiety       MINOCIN PO      Take by mouth 2 times daily        SPIRONOLACTONE PO      Take by mouth 2 times daily

## 2018-02-16 NOTE — LETTER
[place] [address] [facility]  [address]  [place]  [address]  Tel: [phone]    February 20, 2018    Suzie Castillo  [address] [facility] [address]  [place] [address]        Dear Ms. Castillo,    Your thyroid was normal. Thanks    If you have any further questions or problems, please contact our office.      Sincerely,    Hazel Lunsford, YULISSA/ Denisse Garcia, CMA  Results for orders placed or performed in visit on 02/16/18   TSH with free T4 reflex   Result Value Ref Range    TSH 2.02 0.40 - 4.00 mU/L               Enclosure: Lab Results

## 2018-02-20 ENCOUNTER — TELEPHONE (OUTPATIENT)
Dept: FAMILY MEDICINE | Facility: CLINIC | Age: 38
End: 2018-02-20

## 2018-02-20 NOTE — TELEPHONE ENCOUNTER
I called patient and left message to return our call at [phone].  Julia Hodge CMA  I sent a letter to patient with results.  Julia Hodge CMA

## 2018-02-27 DIAGNOSIS — F41.8 DEPRESSION WITH ANXIETY: ICD-10-CM

## 2018-02-27 RX ORDER — LORAZEPAM 0.5 MG/1
TABLET ORAL
Qty: 20 TABLET | Refills: 0 | Status: SHIPPED | OUTPATIENT
Start: 2018-02-27 | End: 2018-05-08

## 2018-02-27 NOTE — TELEPHONE ENCOUNTER
LORazepam (ATIVAN) 0.5 MG tablet        Last Written Prescription Date:  11/13/2017  Last Fill Quantity: 20,   # refills: 0  Last Office Visit: 11/14/2017  Future Office visit:    Next 5 appointments (look out 90 days)     Mar 07, 2018  5:00 PM CST   Return Visit with Katharina Reyez, [facility] [place] ([facility] [place])    [address] [facility] [address]  [place] [address]   [phone]            Mar 28, 2018  5:00 PM CDT   Return Visit with Ktaharina Reyez, [facility] [place] ([facility] [place])    [address]  [facility] [address]   [phone]                   Routing refill request to provider for review/approval because:  Drug not on the [facility], [facility] or [facility] refill protocol or controlled substance

## 2018-03-06 ENCOUNTER — MEDICAL CORRESPONDENCE (OUTPATIENT)
Dept: HEALTH INFORMATION MANAGEMENT | Facility: CLINIC | Age: 38
End: 2018-03-06

## 2018-03-07 ENCOUNTER — OFFICE VISIT (OUTPATIENT)
Dept: PSYCHOLOGY | Facility: CLINIC | Age: 38
End: 2018-03-07
Payer: COMMERCIAL

## 2018-03-07 DIAGNOSIS — F43.23 ADJUSTMENT DISORDER WITH MIXED ANXIETY AND DEPRESSED MOOD: Primary | ICD-10-CM

## 2018-03-07 PROCEDURE — 90834 PSYTX W PT 45 MINUTES: CPT | Performed by: SOCIAL WORKER

## 2018-03-07 NOTE — MR AVS SNAPSHOT
"                  MRN:[id number]                      After Visit Summary   3/7/2018    Suzie Castillo    MRN: [id number]           Visit Information        Provider Department      3/7/2018 5:00 PM Katharina Reyez, [facility]      Your next 10 appointments already scheduled     Mar 28, 2018  5:00 PM CDT   Return Visit with Katharina Reyez  [place] ([facility] [place])    [address]  [place] [address]   [phone]            2018  5:00 PM CDT   Return Visit with Katharina Reyez  [place] ([facility] [place])    [address]  [facility] [address] [phone]              MyChart Information     [employer] lets you send messages to your doctor, view your test results, renew your prescriptions, schedule appointments and more. To sign up, go to [email or web address] . Click on \"Log in\" on the left side of the screen, which will take you to the Welcome page. Then click on \"Sign up Now\" on the right side of the page.     You will be asked to enter the access code listed below, as well as some personal information. Please follow the directions to create your username and password.     Your access code is: [email or web address]  Expires: 2018  1:03 PM     Your access code will  in 90 days. If you need help or a new code, please call your [place] clinic or [phone].        Care EveryWhere ID     This is your Care EveryWhere ID. This could be used by other organizations to access your [place] medical records  [id number]        Equal Access to Services     NGUYEN HICKEY AH: Hadii sacha Hein, wafridada luqadaha, qaybta kaalmada adeartieyada, orlando pichardo. So [facility] [phone].    ATENCIÓN: Si habla español, tiene a grant disposición servicios gratuitos de asistencia lingüística. Llame al [phone].    We comply with applicable federal civil rights " laws and [place] laws. We do not discriminate on the basis of race, color, national origin, age, disability, sex, sexual orientation, or gender identity.

## 2018-03-08 ASSESSMENT — ANXIETY QUESTIONNAIRES
GAD7 TOTAL SCORE: 8
2. NOT BEING ABLE TO STOP OR CONTROL WORRYING: MORE THAN HALF THE DAYS
7. FEELING AFRAID AS IF SOMETHING AWFUL MIGHT HAPPEN: NOT AT ALL
5. BEING SO RESTLESS THAT IT IS HARD TO SIT STILL: NOT AT ALL
IF YOU CHECKED OFF ANY PROBLEMS ON THIS QUESTIONNAIRE, HOW DIFFICULT HAVE THESE PROBLEMS MADE IT FOR YOU TO DO YOUR WORK, TAKE CARE OF THINGS AT HOME, OR GET ALONG WITH OTHER PEOPLE: SOMEWHAT DIFFICULT
6. BECOMING EASILY ANNOYED OR IRRITABLE: SEVERAL DAYS
3. WORRYING TOO MUCH ABOUT DIFFERENT THINGS: MORE THAN HALF THE DAYS
1. FEELING NERVOUS, ANXIOUS, OR ON EDGE: MORE THAN HALF THE DAYS

## 2018-03-08 ASSESSMENT — PATIENT HEALTH QUESTIONNAIRE - PHQ9: 5. POOR APPETITE OR OVEREATING: SEVERAL DAYS

## 2018-03-08 NOTE — PROGRESS NOTES
Progress Note    Client Name: Suzie Castillo  Date: 3/7/18               Service Type: Individual      Session Start Time: 5pm    Session End Time: 550      Session Length: 50     Session #:  17     Attendees: Client attended alone    Treatment Plan Last Reviewed: today  PHQ-9 / TWYLA-7 : 9;8-improvement.  DATA      Progress Since Last Session (Related to Symptoms / Goals / Homework):   Symptoms: Stable    Homework: Completed in session      Episode of Care Goals: Satisfactory progress - PREPARATION (Decided to change - considering how); Intervened by negotiating a change plan and determining options / strategies for behavior change, identifying triggers, exploring social supports, and working towards setting a date to begin behavior change. Improving mood symptoms. Being assertive in relationship and feeling overall confident.    Current / Ongoing Stressors and Concerns:   Recent breakup of 6 year relationship; grief issues; new job and relationship. Grieving mother.   Treatment Objective(s) Addressed in This Session:   management of mood and procesing grief. Wants to undestand patterns underpinning dysfunction in relaitonships.    Working on seeing patterns in systems she moves in. Much more confident and feeling like she can be her authentic self again. Working on recognizing what she wants and needs and deserves out of relationship. Able to vocalize her wants and challenge him on his behavior she finds difficult.   Intervention:   Systems/CBT. Build self awareness. Understanding role that thinking has in creation and maintenance of mood. Behaviorally acting in more assertive ways with encouragement. Willing to take communication risks she hasnt taken in past relationships with positive benefit so far.           ASSESSMENT: Current Emotional / Mental Status (status of significant symptoms):   Risk status (Self / Other harm or suicidal ideation)   Client denies  current fears or concerns for personal safety.   Client denies current or recent suicidal ideation or behaviors.   Client denies current or recent homicidal ideation or behaviors.   Client denies current or recent self injurious behavior or ideation.   Client denies other safety concerns.   A safety and risk management plan has not been developed at this time, however client was given the after-hours number / 911 should there be a change in any of these risk factors.     Appearance:   Appropriate    Eye Contact:   Good    Psychomotor Behavior: Normal    Attitude:   Cooperative    Orientation:   All   Speech    Rate / Production: Normal     Volume:  Normal    Mood:    Anxious determined    Affect:    Appropriate    Thought Content:  Clear    Thought Form:  Coherent  Logical    Insight:    Good      Medication Review:   No changes to current psychiatric medication(s)     Medication Compliance:   Yes     Changes in Health Issues:   None reported     Chemical Use Review:   Substance Use: Chemical use reviewed, no active concerns identified      Tobacco Use: No current tobacco use.       Collateral Reports Completed:   Not Applicable    PLAN: (Client Tasks / Therapist Tasks / Other)  Continue as planned. Returns in 2-3  weeks.    Katharina Reyez, [facility], 2/14/18                                                         ________________________________________________________________________    Treatment Plan    Client's Name: Suzie Castillo  YOB: 1980    Date: 6/30/2017      DSM-V Diagnoses: Adjustment Disorders  309.28 (F43.23) With mixed anxiety and depressed mood  Psychosocial / Contextual Factors: Good social/family supports  WHODAS: see intake.    Referral / Collaboration:  Referral to another professional/service is not indicated at this time..    Anticipated number of session or this episode of care: evaluate every 90 days.      MeasurableTreatment Goal(s) related to diagnosis / functional  impairment(s)  Goal 1: Client will build self awareness.    I will know I've met my goal when I understand better what went wrong.      Objective #A (Client Action)    Client will be able to ID and chanllenge patterns in thinking feeling and behavior that are self defeating..  Status: new     Intervention(s)  Therapist will teach CBT/mindfulness..    Objective #B  Client will process losses and greif..  Status: new     Intervention(s)  Therapist will provide education/support and resources as indicated..    Objective #C  Client will Engage in 2 self care behaviors on weekly basis..  Status: new     Intervention(s)  Therapist will provide support and resources as indicated..    Client has reviewed and agreed to the above plan.      Katharina Reyez, MIS  June 30, 2017

## 2018-03-09 ASSESSMENT — ANXIETY QUESTIONNAIRES: GAD7 TOTAL SCORE: 8

## 2018-03-09 ASSESSMENT — PATIENT HEALTH QUESTIONNAIRE - PHQ9: SUM OF ALL RESPONSES TO PHQ QUESTIONS 1-9: 9

## 2018-04-18 ENCOUNTER — OFFICE VISIT (OUTPATIENT)
Dept: PSYCHOLOGY | Facility: CLINIC | Age: 38
End: 2018-04-18
Payer: COMMERCIAL

## 2018-04-18 DIAGNOSIS — F43.23 ADJUSTMENT DISORDER WITH MIXED ANXIETY AND DEPRESSED MOOD: Primary | ICD-10-CM

## 2018-04-18 PROCEDURE — 90834 PSYTX W PT 45 MINUTES: CPT | Performed by: SOCIAL WORKER

## 2018-04-18 NOTE — MR AVS SNAPSHOT
"                  MRN:[id number]                      After Visit Summary   2018    Suzie Castillo    MRN: [id number]           Visit Information        Provider Department      2018 5:00 PM Dereje KatharinaMIS [facility]      Your next 10 appointments already scheduled     May 15, 2018 11:00 AM CDT   Return Visit with Katharina ReyezMIS   [facility] ([facility])    [address]  [facility] [address] [phone]              MyChart Information     [employer] lets you send messages to your doctor, view your test results, renew your prescriptions, schedule appointments and more. To sign up, go to [email or web address] . Click on \"Log in\" on the left side of the screen, which will take you to the Welcome page. Then click on \"Sign up Now\" on the right side of the page.     You will be asked to enter the access code listed below, as well as some personal information. Please follow the directions to create your username and password.     Your access code is: [email or web address]  Expires: 2018  2:03 PM     Your access code will  in 90 days. If you need help or a new code, please call your [place] clinic or [phone].        Care EveryWhere ID     This is your Care EveryWhere ID. This could be used by other organizations to access your [place] medical records  [id number]        Equal Access to Services     LUDY HICKEY AH: Hadii sacha barrera hadasho Sojethroali, waaxda luqadaha, qaybta kaalmada adeegyada, orlando murillo manuelartie steele . So [facility] [place] [facility] [phone].    ATENCIÓN: Si habla español, tiene a grant disposición servicios gratuitos de asistencia lingüística. Llame al [phone].    We comply with applicable federal civil rights laws and [place] laws. We do not discriminate on the basis of race, color, national origin, age, disability, sex, sexual orientation, or gender identity.            "

## 2018-04-19 ASSESSMENT — ANXIETY QUESTIONNAIRES
7. FEELING AFRAID AS IF SOMETHING AWFUL MIGHT HAPPEN: NOT AT ALL
IF YOU CHECKED OFF ANY PROBLEMS ON THIS QUESTIONNAIRE, HOW DIFFICULT HAVE THESE PROBLEMS MADE IT FOR YOU TO DO YOUR WORK, TAKE CARE OF THINGS AT HOME, OR GET ALONG WITH OTHER PEOPLE: SOMEWHAT DIFFICULT
5. BEING SO RESTLESS THAT IT IS HARD TO SIT STILL: NOT AT ALL
GAD7 TOTAL SCORE: 7
1. FEELING NERVOUS, ANXIOUS, OR ON EDGE: MORE THAN HALF THE DAYS
2. NOT BEING ABLE TO STOP OR CONTROL WORRYING: SEVERAL DAYS
3. WORRYING TOO MUCH ABOUT DIFFERENT THINGS: SEVERAL DAYS
6. BECOMING EASILY ANNOYED OR IRRITABLE: SEVERAL DAYS

## 2018-04-19 ASSESSMENT — PATIENT HEALTH QUESTIONNAIRE - PHQ9: 5. POOR APPETITE OR OVEREATING: MORE THAN HALF THE DAYS

## 2018-04-19 NOTE — PROGRESS NOTES
Progress Note    Client Name: Suzie Castillo  Date: 4/18/2018               Service Type: Individual      Session Start Time: 5pm    Session End Time: 550      Session Length: 50     Session #:  18     Attendees: Client attended alone    Treatment Plan Last Reviewed: today  PHQ-9 / TWYLA-7 : 9;7-improvement.  DATA      Progress Since Last Session (Related to Symptoms / Goals / Homework):   Symptoms: Stable    Homework: n/a; not seen in 6 weeks      Episode of Care Goals: Satisfactory progress - PREPARATION (Decided to change - considering how); Intervened by negotiating a change plan and determining options / strategies for behavior change, identifying triggers, exploring social supports, and working towards setting a date to begin behavior change. Improving mood symptoms. Being assertive in relationship and feeling overall confident. Reporting her brother has returned to halfway due to toxic relationship between he and estranged gf. Tearful-brings up grief as well as it was the 4 year anniversary of her mothers death yesterday.   Current / Ongoing Stressors and Concerns:  Breakup of 6 year relationship; grief issues; new job and relationship. Grieving her mother.   Treatment Objective(s) Addressed in This Session:   management of mood and procesing grief. Wants to undestand patterns underpinning dysfunction in relaitonships.   Historic: Working on seeing patterns in systems she moves in. Much more confident and feeling like she can be her authentic self again. Working on recognizing what she wants and needs and deserves out of relationship. Able to vocalize her wants and challenge new BF on his behavior she finds difficult. Processing her past relationship with more education and awareness.Accepted book referral, [facility].   Intervention:   Systems/CBT. Build self awareness. Understanding role that thinking has in creation and maintenance of mood.  Behaviorally acting in more assertive ways with encouragement. Willing to take communication risks she hasnt taken in past relationships with positive benefit so far. Not holding herself responsible for dysfunction in past relationship.          ASSESSMENT: Current Emotional / Mental Status (status of significant symptoms):   Risk status (Self / Other harm or suicidal ideation)   Client denies current fears or concerns for personal safety.   Client denies current or recent suicidal ideation or behaviors.   Client denies current or recent homicidal ideation or behaviors.   Client denies current or recent self injurious behavior or ideation.   Client denies other safety concerns.   A safety and risk management plan has not been developed at this time, however client was given the after-hours number / 911 should there be a change in any of these risk factors.     Appearance:   Appropriate    Eye Contact:   Good    Psychomotor Behavior: Normal    Attitude:   Cooperative    Orientation:   All   Speech    Rate / Production: Normal     Volume:  Normal    Mood:    Anxious determined    Affect:    Appropriate    Thought Content:  Clear    Thought Form:  Coherent  Logical    Insight:    Good      Medication Review:   No changes to current psychiatric medication(s)     Medication Compliance:   Yes     Changes in Health Issues:   None reported     Chemical Use Review:   Substance Use: Chemical use reviewed, no active concerns identified      Tobacco Use: No current tobacco use.       Collateral Reports Completed:   Not Applicable    PLAN: (Client Tasks / Therapist Tasks / Other)  Continue as planned. Returns in 2-3  weeks.    Katharina Reyez, [facility], 4/18/2018                                                           ________________________________________________________________________    Treatment Plan    Client's Name: Suzie Castillo  YOB: 1980    Date: 6/30/2017      DSM-V Diagnoses: Adjustment  Disorders  309.28 (F43.23) With mixed anxiety and depressed mood  Psychosocial / Contextual Factors: Good social/family supports  WHODAS: see intake.    Referral / Collaboration:  Referral to another professional/service is not indicated at this time..    Anticipated number of session or this episode of care: evaluate every 90 days.      MeasurableTreatment Goal(s) related to diagnosis / functional impairment(s)  Goal 1: Client will build self awareness.    I will know I've met my goal when I understand better what went wrong.      Objective #A (Client Action)    Client will be able to ID and chanllenge patterns in thinking feeling and behavior that are self defeating..  Status: new     Intervention(s)  Therapist will teach CBT/mindfulness..    Objective #B  Client will process losses and greif..  Status: new     Intervention(s)  Therapist will provide education/support and resources as indicated..    Objective #C  Client will Engage in 2 self care behaviors on weekly basis..  Status: new     Intervention(s)  Therapist will provide support and resources as indicated..    Client has reviewed and agreed to the above plan.      MIS Reid  June 30, 2017

## 2018-04-20 ASSESSMENT — ANXIETY QUESTIONNAIRES: GAD7 TOTAL SCORE: 7

## 2018-04-20 ASSESSMENT — PATIENT HEALTH QUESTIONNAIRE - PHQ9: SUM OF ALL RESPONSES TO PHQ QUESTIONS 1-9: 9

## 2018-05-08 DIAGNOSIS — F41.8 DEPRESSION WITH ANXIETY: ICD-10-CM

## 2018-05-08 NOTE — TELEPHONE ENCOUNTER
LORazepam (ATIVAN) 0.5 MG tablet        Last Written Prescription Date:  2/27/2018  Last Fill Quantity: 20,   # refills: 0  Last Office Visit: 11/14/2017  Future Office visit:    Next 5 appointments (look out 90 days)     May 15, 2018 11:00 AM CDT   Return Visit with Katharina Reyez [facility]    [address]  [address]  [facility] [address]   [phone]                   Routing refill request to provider for review/approval because:  Drug not on the [facility], [facility] or [facility] refill protocol or controlled substance

## 2018-05-09 RX ORDER — LORAZEPAM 0.5 MG/1
TABLET ORAL
Qty: 20 TABLET | Refills: 0 | Status: SHIPPED | OUTPATIENT
Start: 2018-05-09 | End: 2018-07-25

## 2018-06-22 ENCOUNTER — TELEPHONE (OUTPATIENT)
Dept: FAMILY MEDICINE | Facility: CLINIC | Age: 38
End: 2018-06-22

## 2018-06-22 NOTE — TELEPHONE ENCOUNTER
Reason for Call:  Other Letter for emotional support animal    Detailed comments: Pt would like a letter for the apartment complex she lives in so she can have an emotional support animal living with her.    Pt would like to  at clinic.    Phone Number Patient can be reached at: Home number on file [phone] (home)    Best Time: Anytime    Can we leave a detailed message on this number? YES    Call taken on 6/22/2018 at 10:20 AM by Toña Gates

## 2018-06-26 ENCOUNTER — OFFICE VISIT (OUTPATIENT)
Dept: FAMILY MEDICINE | Facility: CLINIC | Age: 38
End: 2018-06-26
Payer: COMMERCIAL

## 2018-06-26 VITALS
HEART RATE: 111 BPM | OXYGEN SATURATION: 99 % | BODY MASS INDEX: 29.35 KG/M2 | WEIGHT: 187 LBS | DIASTOLIC BLOOD PRESSURE: 67 MMHG | SYSTOLIC BLOOD PRESSURE: 96 MMHG | HEIGHT: 67 IN | TEMPERATURE: 97.9 F

## 2018-06-26 DIAGNOSIS — F43.23 ADJUSTMENT DISORDER WITH MIXED ANXIETY AND DEPRESSED MOOD: Primary | ICD-10-CM

## 2018-06-26 PROCEDURE — 99213 OFFICE O/P EST LOW 20 MIN: CPT | Performed by: INTERNAL MEDICINE

## 2018-06-26 NOTE — LETTER
June 27, 2018      Suzie Castillo  [address]  [facility] [address]        To Whom It May Concern,      Suzie Castillo is being followed for an anxiety disorder which has included extensive evaluation and is currently going through outpatient management. During this process her social network has been limited and caused associated complications and her ongoing therapy.    She has required significant emotional support through her psychologist and associated  workgroup. She has also had significant emotional support on the home front from her pet cat. This is reduced her anxiety and she has not had any significant panic attacks in months emotional support has enabled her to deal with her previous emotional baggage. Also she has gained increasing confidence and increased feeling of her authentic self.    As we would hate to see her lose ground from her normal improvement and a great deal of emotional support she is game for We are asking that she be allowed to keep her head For ongoing emotional support. It is my understanding that she is moving toward different building and needs special permission to be allowed to keep her pet cat.    If there is any further information needed to support this need please contact me.      Sincerely,        Yusuf Doshi MD

## 2018-06-26 NOTE — PROGRESS NOTES
SUBJECTIVE:   Suzie Castillo is a 38 year old female who presents to clinic today for the following health issues:    Chief Complaint   Patient presents with     Letter Request     pt needs a letter stating she needs an emotional support animal for her landlord     The patient with a history of anxiety and depression controlled with Celexa and ativan presents to the clinic to discuss obtaining an emotion support animal letter. She has a cat and her apartment does not allow animals. She feels like her cat helps lessen her anxiety. She has had this cat for the past 12 years and she feels that it would be traumatic for her to have to give up her cat. She feels like if she didn't have her cat, her mental health would regress.        Problem list and histories reviewed & adjusted, as indicated.  Additional history: as documented    Current Outpatient Prescriptions   Medication Sig Dispense Refill     citalopram (CELEXA) 20 MG tablet Take 1 tablet (20 mg) by mouth daily 90 tablet 1     LORazepam (ATIVAN) 0.5 MG tablet TAKE 1 TO 2 TABLETS BY MOUTH TWICE DAILY AS NEEDED FOR ANXIETY 20 tablet 0     Minocycline HCl (MINOCIN PO) Take by mouth 2 times daily       SPIRONOLACTONE PO Take by mouth 2 times daily         Reviewed and updated as needed this visit by clinical staff  Tobacco  Allergies  Meds  Problems       Reviewed and updated as needed this visit by Provider         ROS:  Constitutional, HEENT, cardiovascular, pulmonary, GI, , musculoskeletal, neuro, skin, endocrine and psych systems are negative, except as otherwise noted.    This document serves as a record of the services and decisions personally performed and made by Yusuf Doshi MD. It was created on his behalf by Manuela Romero, a trained medical scribe. The creation of this document is based the provider's statements to the medical scribe. 6/26/2018  OBJECTIVE:   BP 96/67 (BP Location: Left arm, Cuff Size: Adult Regular)  Pulse 111  Temp  "97.9  F (36.6  C) (Tympanic)  Ht 1.702 m (5' 7\")  Wt 84.8 kg (187 lb)  SpO2 99%  BMI 29.29 kg/m2  Body mass index is 29.29 kg/(m^2).  25 minutes spent with patient, over 50% time counseling, coordinating care and explaining about nature of the patient's conditions.    Diagnostic Test Results:  none   ASSESSMENT/PLAN:   1. Adjustment disorder with mixed anxiety and depressed mood  Will provide pt with letter for emotional support animal for her apartment.     Yusuf Doshi MD  [facility]    The information in this document, created by the medical scribe for me, accurately reflects the services I personally performed and the decisions made by me. I have reviewed and approved this document for accuracy prior to leaving the patient care area.  Yusuf Doshi MD  6:13 PM, 06/26/18    "

## 2018-06-26 NOTE — MR AVS SNAPSHOT
"              After Visit Summary   6/26/2018    Suzie Castillo    MRN: [id number]           Patient Information     Date Of Birth          1980        Visit Information        Provider Department      6/26/2018 5:30 PM Yusuf Doshi MD [facility]        Today's Diagnoses     Adjustment disorder with mixed anxiety and depressed mood    -  1       Follow-ups after your visit        Your next 10 appointments already scheduled     Jul 24, 2018  3:00 PM CDT   Return Visit with Katharina Reyez, [facility] [place] ([facility])    [address] [place] [address] [address]  [place] [address]   [phone]            Aug 03, 2018  1:30 PM CDT   Return Visit with Katharina Reyez [facility] [place] ([facility])    [address] [address]  [place] [address]   [phone]              Who to contact     If you have questions or need follow up information about today's clinic visit or your schedule please contact [facility] directly at [phone].  Normal or non-critical lab and imaging results will be communicated to you by [employer], letter or phone within 4 business days after the clinic has received the results. If you do not hear from us within 7 days, please contact the clinic through [employer] or phone. If you have a critical or abnormal lab result, we will notify you by phone as soon as possible.  Submit refill requests through [employer] or call your pharmacy and they will forward the refill request to us. Please allow 3 business days for your refill to be completed.          Additional Information About Your Visit        [employer] Information     [employer] lets you send messages to your doctor, view your test results, renew your prescriptions, schedule appointments and more. To sign up, go to [email or web address] . Click on \"Log in\" on the left side of the screen, which will take you to the Welcome page. Then click on \"Sign up Now\" on the right side " "of the page.     You will be asked to enter the access code listed below, as well as some personal information. Please follow the directions to create your username and password.     Your access code is: [email or web address]  Expires: 2018 11:11 AM     Your access code will  in 90 days. If you need help or a new code, please call your [place] clinic or [phone].        Care EveryWhere ID     This is your Care EveryWhere ID. This could be used by other organizations to access your [place] medical records  [id number]        Your Vitals Were     Pulse Temperature Height Pulse Oximetry BMI (Body Mass Index)       111 97.9  F (36.6  C) (Tympanic) 5' 7\" (1.702 m) 99% 29.29 kg/m2        Blood Pressure from Last 3 Encounters:   18 96/67   18 101/63   17 105/66    Weight from Last 3 Encounters:   18 187 lb (84.8 kg)   18 196 lb (88.9 kg)   17 196 lb (88.9 kg)              Today, you had the following     No orders found for display       Primary Care Provider Office Phone # Fax #    Yusuf Doshi [phone] [address] [address]        Equal Access to Services     [facility]: Hadii sacha ku hadasho Soromero, waaxda luqadaha, qaybta kaalmada berkley, orlando steele . So [facility] [phone].    ATENCIÓN: Si habla español, tiene a grant disposición servicios gratuitos de asistencia lingüística. Tiffany al [phone].    We comply with applicable federal civil rights laws and [place] laws. We do not discriminate on the basis of race, color, national origin, age, disability, sex, sexual orientation, or gender identity.            Thank you!     Thank you for choosing [facility]  for your care. Our goal is always to provide you with excellent care. Hearing back from our patients is one way we can continue to improve our services. Please take a few minutes to complete the written survey that you may " receive in the mail after your visit with us. Thank you!             Your Updated Medication List - Protect others around you: Learn how to safely use, store and throw away your medicines at www.disposemymeds.org.          This list is accurate as of 6/26/18 11:59 PM.  Always use your most recent med list.                   Brand Name Dispense Instructions for use Diagnosis    citalopram 20 MG tablet    celeXA    90 tablet    Take 1 tablet (20 mg) by mouth daily    Depression with anxiety       LORazepam 0.5 MG tablet    ATIVAN    20 tablet    TAKE 1 TO 2 TABLETS BY MOUTH TWICE DAILY AS NEEDED FOR ANXIETY    Depression with anxiety       MINOCIN PO      Take by mouth 2 times daily        SPIRONOLACTONE PO      Take by mouth 2 times daily

## 2018-06-28 NOTE — TELEPHONE ENCOUNTER
Patient was seen by Dr Doshi. Letter was written yesterday and printed. I will call the patient to let her know. Letter placed at , per her initial request to  letter in clinic.    [facility]

## 2018-07-16 NOTE — TELEPHONE ENCOUNTER
Pt states that she also dropped off a form for Dr. Doshi to complete the form is for  Her landlord she is wondering if the form has been completed  Ph. [phone] VM is okay

## 2018-07-24 ENCOUNTER — TELEPHONE (OUTPATIENT)
Dept: FAMILY MEDICINE | Facility: CLINIC | Age: 38
End: 2018-07-24

## 2018-07-24 ENCOUNTER — OFFICE VISIT (OUTPATIENT)
Dept: PSYCHOLOGY | Facility: CLINIC | Age: 38
End: 2018-07-24
Payer: COMMERCIAL

## 2018-07-24 DIAGNOSIS — F41.9 ANXIETY AND DEPRESSION: Primary | ICD-10-CM

## 2018-07-24 DIAGNOSIS — F32.A ANXIETY AND DEPRESSION: Primary | ICD-10-CM

## 2018-07-24 PROCEDURE — 90834 PSYTX W PT 45 MINUTES: CPT | Performed by: SOCIAL WORKER

## 2018-07-24 ASSESSMENT — ANXIETY QUESTIONNAIRES
2. NOT BEING ABLE TO STOP OR CONTROL WORRYING: MORE THAN HALF THE DAYS
3. WORRYING TOO MUCH ABOUT DIFFERENT THINGS: MORE THAN HALF THE DAYS
7. FEELING AFRAID AS IF SOMETHING AWFUL MIGHT HAPPEN: NEARLY EVERY DAY
5. BEING SO RESTLESS THAT IT IS HARD TO SIT STILL: SEVERAL DAYS
1. FEELING NERVOUS, ANXIOUS, OR ON EDGE: MORE THAN HALF THE DAYS
6. BECOMING EASILY ANNOYED OR IRRITABLE: MORE THAN HALF THE DAYS
IF YOU CHECKED OFF ANY PROBLEMS ON THIS QUESTIONNAIRE, HOW DIFFICULT HAVE THESE PROBLEMS MADE IT FOR YOU TO DO YOUR WORK, TAKE CARE OF THINGS AT HOME, OR GET ALONG WITH OTHER PEOPLE: SOMEWHAT DIFFICULT
GAD7 TOTAL SCORE: 13

## 2018-07-24 ASSESSMENT — PATIENT HEALTH QUESTIONNAIRE - PHQ9: 5. POOR APPETITE OR OVEREATING: SEVERAL DAYS

## 2018-07-24 NOTE — MR AVS SNAPSHOT
"                  MRN:[id number]                      After Visit Summary   7/24/2018    Suzie Castillo    MRN: [id number]           Visit Information        Provider Department      7/24/2018 3:00 PM Dereje Katharina,  [place]        Your next 10 appointments already scheduled     Aug 03, 2018  1:30 PM CDT   Return Visit with Katharina Reyez  [place] [facility] [place])    [address]  [facility] [address]   [phone]              Care Instructions      Suzie Castillo     SAFETY PLAN:  Step 1: Warning signs / cues (Thoughts, images, mood, situation, behavior) that a crisis may be developing:    Thoughts: \"I can't do this anymore\"    Images: visions of harm: throw myself off a building    Thinking Processes: ruminations (can't stop thinking about my problems): about worries, intrusive thoughts (bothersome, unwanted thoughts that come out of nowhere): come at [facility] mostly when I've slowed down and highly critical and negative thoughts: rejection at work, relationship troubles I'm not good enough.    Mood: worsening depression, hopelessness, intense worry, agitation and anxiety/panic    Behaviors: isolating/withdrawing , not taking care of my responsibilities, sleeping too much and not letting myself cry    Situations: loss: employment, brothers mean behavior, pain, relationship problems and financial stress   Step 2: Coping strategies - Things I can do to take my mind off of my problems without contacting another person (relaxation technique, physical activity):    Distress Tolerance Strategies:  relaxation activities: take a nap, take a walk, reach out to people, play with my pet , paced breathing/progressive muscle relaxation and breathing tools    Physical Activities: go for a walk, yoga, deep breathing and stretching     Focus on helpful thoughts:  \"I will get through this\", \"Ride the wave\" and self-compassion statements: try and " "reframe my thoughts and not personalize things; it will get better.  Step 3: People and social settings that provide distraction:   Name: dante mendez; my dad   Name: Janet   Name:  James the cat     coffee shop, park and concerts , friends houses/families  Step 4: Remind myself of people and things that are important to me and worth living for:  Do my mantra of gratefulness for all the people I have in my life and my blessings. Create a sacred ritual for myself. Reach out to people I appreciate and let them know I care.      Step 5: When I am in crisis, I can ask these people to help me use my safety plan:   Name:  Rosa    Name:  Rachelle   Name:  Dad   Step 6: Making the environment safe:     be around others; dont isolate. Maintain meds and counseling.  Step 7: Professionals or agencies I can contact during a crisis:    [facility] Daytime and After Hours Crisis Number: 172-023-7718    Suicide Prevention Lifeline: 3-647-767-TALK 8297)    Crisis Text Line Service (available 24 hours a day, 7 days a week): Text [place] to 527523  Local Crisis Services:  COPE     Call 911 or go to my nearest emergency department.   I helped develop this safety plan and agree to use it when needed.  I have been given a copy of this plan.      Client signature _________________________________________________________________  Today s date:  7/24/2018  Adapted from Safety Plan Template 2008 Ayse Kate and Fran Costa is reprinted with the express permission of the authors.  No portion of the Safety Plan Template may be reproduced without the express, written permission.  You can contact the authors at [email or web address] or [email or web address].                   MyChart Information     [employer] lets you send messages to your doctor, view your test results, renew your prescriptions, schedule appointments and more. To sign up, go to [email or web address] . Click on \"Log in\" on the left side of the " "screen, which will take you to the Welcome page. Then click on \"Sign up Now\" on the right side of the page.     You will be asked to enter the access code listed below, as well as some personal information. Please follow the directions to create your username and password.     Your access code is: [email or web address]  Expires: 2018 11:11 AM     Your access code will  in 90 days. If you need help or a new code, please call your [place] clinic or [phone].        Care EveryWhere ID     This is your Care EveryWhere ID. This could be used by other organizations to access your [place] medical records  [id number]        Equal Access to Services     LUDY HICKEY : Filiberto Hein, aftab hunter, pillo gooden, orlando pichardo. So [facility] [phone].    ATENCIÓN: Si habla español, tiene a grant disposición servicios gratuitos de asistencia lingüística. Llame al [phone].    We comply with applicable federal civil rights laws and [place] laws. We do not discriminate on the basis of race, color, national origin, age, disability, sex, sexual orientation, or gender identity.            "

## 2018-07-24 NOTE — PATIENT INSTRUCTIONS
"  Suzie Castillo     SAFETY PLAN:  Step 1: Warning signs / cues (Thoughts, images, mood, situation, behavior) that a crisis may be developing:    Thoughts: \"I can't do this anymore\"    Images: visions of harm: throw myself off a building    Thinking Processes: ruminations (can't stop thinking about my problems): about worries, intrusive thoughts (bothersome, unwanted thoughts that come out of nowhere): come at [facility] mostly when I've slowed down and highly critical and negative thoughts: rejection at work, relationship troubles I'm not good enough.    Mood: worsening depression, hopelessness, intense worry, agitation and anxiety/panic    Behaviors: isolating/withdrawing , not taking care of my responsibilities, sleeping too much and not letting myself cry    Situations: loss: employment, brothers mean behavior, pain, relationship problems and financial stress   Step 2: Coping strategies - Things I can do to take my mind off of my problems without contacting another person (relaxation technique, physical activity):    Distress Tolerance Strategies:  relaxation activities: take a nap, take a walk, reach out to people, play with my pet , paced breathing/progressive muscle relaxation and breathing tools    Physical Activities: go for a walk, yoga, deep breathing and stretching     Focus on helpful thoughts:  \"I will get through this\", \"Ride the wave\" and self-compassion statements: try and reframe my thoughts and not personalize things; it will get better.  Step 3: People and social settings that provide distraction:   Name: dante mendez; my dad   Name: Janet   Name:  James the cat     coffee shop, park and concerts , friends houses/families  Step 4: Remind myself of people and things that are important to me and worth living for:  Do my mantra of gratefulness for all the people I have in my life and my blessings. Create a sacred ritual for myself. Reach out to people I appreciate and let them know I care.      Step " 5: When I am in crisis, I can ask these people to help me use my safety plan:   Name:  Rosa    Name:  Rachelle   Name:  Dad   Step 6: Making the environment safe:     be around others; dont isolate. Maintain meds and counseling.  Step 7: Professionals or agencies I can contact during a crisis:    [facility] Daytime and After Hours Crisis Number: 510-298-5773    Suicide Prevention Lifeline: 4-177-898-RJMD (2035)    Crisis Text Line Service (available 24 hours a day, 7 days a week): Text [place] to 174743  [facility] Crisis Services:  COPE     Call 911 or go to my nearest emergency department.   I helped develop this safety plan and agree to use it when needed.  I have been given a copy of this plan.      Client signature _________________________________________________________________  Today s date:  7/24/2018  Adapted from Safety Plan Template 2008 Ayse Kate and Fran Costa is reprinted with the express permission of the authors.  No portion of the Safety Plan Template may be reproduced without the express, written permission.  You can contact the authors at [email or web address] or [email or web address].

## 2018-07-24 NOTE — TELEPHONE ENCOUNTER
Reason for Call:  Form, our goal is to have forms completed with 72 hours, however, some forms may require a visit or additional information.    Type of letter, form or note:  Emotional support dog    Who is the form from?: Patient    Where did the form come from: Patient or family brought in       What clinic location was the form placed at?: [facility]    Where the form was placed: 's Box    What number is listed as a contact on the form?: [phone]       Additional comments:     Call taken on 7/24/2018 at 4:04 PM by Tish Danielson

## 2018-07-24 NOTE — PROGRESS NOTES
Progress Note    Client Name: Suzie Castillo  Date:7/24/2018               Service Type: Individual      Session Start Time: 3pm    Session End Time: 350      Session Length: 50     Session #:  19     Attendees: Client attended alone    Treatment Plan Last Reviewed: today  PHQ-9 / TWYLA-7 : 13;13  DATA      Progress Since Last Session (Related to Symptoms / Goals / Homework):   Symptoms: Worsening not seen in 3 months.    Homework: n/a      Episode of Care Goals: Satisfactory progress - PREPARATION (Decided to change - considering how); Intervened by negotiating a change plan and determining options / strategies for behavior change, identifying triggers, exploring social supports, and working towards setting a date to begin behavior change. 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 Moving in sept to cut costs. Broke off with BF of 9 months. Feeling overall alternately good about some things and sad about others. Recent interaction with brother that felt mean.  Stressor led to a momentary episode of passiveSI; states she has been feeling this way once every 2 weeks for the past 6 months. Agreeing to co create safety plan in session today.       Current / Ongoing Stressors and Concerns:  Breakup of 6 year relationship; grief issues; financial stressors.       Treatment Objective(s) Addressed in This Session:   management of mood and procesing grief. Wants to undestand patterns underpinning dysfunction in relaitonships.   Historic: Working on seeing patterns in systems she moves in. Much more confident and feeling like she can be her authentic self again. Working on recognizing what she wants and needs and deserves out of relationship. Able to vocalize her wants and challenge new BF on his behavior she finds difficult. Processing her past relationship with more education and awareness. Able to break up with bf of 9 months w ongoing  support from women friends. Feels proud of this.      Intervention:   Systems/CBT. Build self awareness. Understanding role that thinking has in creation and maintenance of mood. Behaviorally acting in more assertive ways with encouragement. Willing to take communication risks she hasnt taken in past relationships with positive benefit so far. Not holding herself responsible for dysfunction in past relationship. Co created safety plan and reviewed and signed. Agreeing to give this to 3 important people in her life. Scheduling more frequent therapy appnts.          ASSESSMENT: Current Emotional / Mental Status (status of significant symptoms):   Risk status (Self / Other harm or suicidal ideation)   Client denies current fears or concerns for personal safety.   Client reports the following current or recent suicidal ideation or behaviors: Si out of frustration and brief but discloses this occurring once every 2 weeks for past 6 months. No prior hx of attempts..   Client denies current or recent homicidal ideation or behaviors.   Client denies current or recent self injurious behavior or ideation.   Client denies other safety concerns.   A safety and risk management plan has been developed including: Client consented to co-developed safety plan.  [facility]'s safety and risk management plan was completed.  Client agreed to use safety plan should any safety concerns arise.  A copy was given to the patient.     Appearance:   Appropriate    Eye Contact:   Good    Psychomotor Behavior: Normal    Attitude:   Cooperative    Orientation:   All   Speech    Rate / Production: Normal     Volume:  Normal    Mood:    Anxious sad, down   Affect:    Appropriate    Thought Content:  Clear    Thought Form:  Coherent  Logical    Insight:    Good      Medication Review:   No changes to current psychiatric medication(s)     Medication Compliance:   Yes     Changes in Health Issues:   None reported     Chemical Use Review:   Substance Use:  Chemical use reviewed, no active concerns identified      Tobacco Use: No current tobacco use.       Collateral Reports Completed:   Not Applicable    PLAN: (Client Tasks / Therapist Tasks / Other)  Continue as planned. Returns in 2 weeks for monitoring and re engagment in therapy.    MIS Reid, 7/24/2018                                                           ________________________________________________________________________    Treatment Plan    Client's Name: Suzie Castillo  YOB: 1980    Date: 6/30/2017      DSM-V Diagnoses: 311 (F32.8) Other/unspec. Depressive Disorder  Psychosocial / Contextual Factors: Good social/family supports  WHODAS: see intake.    Referral / Collaboration:  Referral to another professional/service is not indicated at this time..    Anticipated number of session or this episode of care: evaluate every 90 days.      MeasurableTreatment Goal(s) related to diagnosis / functional impairment(s)  Goal 1: Client will build self awareness.    I will know I've met my goal when I understand better what went wrong.      Objective #A (Client Action)    Client will be able to ID and chanllenge patterns in thinking feeling and behavior that are self defeating..  Status: new     Intervention(s)  Therapist will teach CBT/mindfulness..    Objective #B  Client will process losses and greif..  Status: new     Intervention(s)  Therapist will provide education/support and resources as indicated..    Objective #C  Client will Engage in 2 self care behaviors on weekly basis..  Status: new     Intervention(s)  Therapist will provide support and resources as indicated..    Client has reviewed and agreed to the above plan.      MIS Reid  7/24/2018

## 2018-07-25 DIAGNOSIS — F41.8 DEPRESSION WITH ANXIETY: ICD-10-CM

## 2018-07-25 DIAGNOSIS — F43.23 ADJUSTMENT DISORDER WITH MIXED ANXIETY AND DEPRESSED MOOD: ICD-10-CM

## 2018-07-25 ASSESSMENT — PATIENT HEALTH QUESTIONNAIRE - PHQ9: SUM OF ALL RESPONSES TO PHQ QUESTIONS 1-9: 13

## 2018-07-25 ASSESSMENT — ANXIETY QUESTIONNAIRES: GAD7 TOTAL SCORE: 13

## 2018-07-26 RX ORDER — LORAZEPAM 0.5 MG/1
TABLET ORAL
OUTPATIENT
Start: 2018-07-26

## 2018-07-26 RX ORDER — LORAZEPAM 0.5 MG/1
TABLET ORAL
Qty: 20 TABLET | Refills: 0 | Status: SHIPPED | OUTPATIENT
Start: 2018-07-26 | End: 2018-09-10

## 2018-07-26 NOTE — TELEPHONE ENCOUNTER
Requested Prescriptions   Pending Prescriptions Disp Refills     LORazepam (ATIVAN) 0.5 MG tablet [Pharmacy Med Name: LORAZEPAM 0.5MG TABLETS] 20 tablet 0     Sig: TAKE 1-2 TABLETS BY MOUTH TWICE DAILY AS NEEDED FOR ANXIETY    There is no refill protocol information for this order     LORazepam (ATIVAN) 0.5 MG tablet      Last Written Prescription Date:  5/09/18  Last Fill Quantity: 20 tablet,   # refills: 0  Last Office Visit: 6/26/18 (Glenda)  Future Office visit:        Routing refill request to provider for review/approval because:  Drug not on the [facility] or [facility] refill protocol or controlled substance           LORazepam (ATIVAN) 0.5 MG tablet [Pharmacy Med Name: LORAZEPAM 0.5MG TABLETS] 20 tablet 0     Sig: TAKE 1-2 TABLETS BY MOUTH TWICE DAILY AS NEEDED FOR ANXIETY    There is no refill protocol information for this order        LORazepam (ATIVAN) 0.5 MG tablet [Pharmacy Med Name: LORAZEPAM 0.5MG TABLETS] 20 tablet 0     Sig: TAKE 1-2 TABLETS BY MOUTH TWICE DAILY AS NEEDED FOR ANXIETY    There is no refill protocol information for this order

## 2018-07-26 NOTE — TELEPHONE ENCOUNTER
Patient is followed by ADELINA ALCANTAR for ongoing prescription of benzodiazepines.  All refills should be approved by this provider, or covering partner.    Medication(s): Lorazepam 0.5mg.   Maximum quantity per month: 20 tablets  Clinic visit frequency required: Q 6  months     Controlled substance agreement on file: No  Benzodiazepine use reviewed by psychiatry:  No    Last [facility] website verification:  done on 07/26/2018   [email or web address]     Routing refill request to provider for review/approval because:  Drug not on the [facility] refill protocol     Daphne LAIRD RN

## 2018-07-31 NOTE — TELEPHONE ENCOUNTER
Pt calling to make sure Dr. Doshi received the form  It is for an Emotional Support cat.   Pt can either  the form or it can be faxed to [phone] ( [facility])  Ph. For Pt [phone] VM is okay

## 2018-08-03 ENCOUNTER — OFFICE VISIT (OUTPATIENT)
Dept: PSYCHOLOGY | Facility: CLINIC | Age: 38
End: 2018-08-03
Payer: COMMERCIAL

## 2018-08-03 DIAGNOSIS — F41.9 ANXIETY AND DEPRESSION: Primary | ICD-10-CM

## 2018-08-03 DIAGNOSIS — F32.A ANXIETY AND DEPRESSION: Primary | ICD-10-CM

## 2018-08-03 PROCEDURE — 90834 PSYTX W PT 45 MINUTES: CPT | Performed by: SOCIAL WORKER

## 2018-08-03 NOTE — TELEPHONE ENCOUNTER
Patient called to see if this form had been faxed?    She would like a call back so she knows if/when it was sent.    Patient can be reached at:  [phone]  Ol to leave a detailed message at this number

## 2018-08-03 NOTE — MR AVS SNAPSHOT
"                  MRN:[id number]                      After Visit Summary   8/3/2018    Suzie Castillo    MRN: [id number]           Visit Information        Provider Department      8/3/2018 1:30 PM Katharina Reyez, [facility]      Your next 10 appointments already scheduled     Aug 29, 2018  2:00 PM CDT   Return Visit with Katharina Reyez [facility] ([facility])    [address]  [facility] [address]   [phone]            Sep 20, 2018  1:00 PM CDT   Return Visit with Katharina Reyez [facility] ([facility])    [address]  [facility] [address] [phone]              MyChart Information     [email or web address] lets you send messages to your doctor, view your test results, renew your prescriptions, schedule appointments and more. To sign up, go to [email or web address] . Click on \"Log in\" on the left side of the screen, which will take you to the Welcome page. Then click on \"Sign up Now\" on the right side of the page.     You will be asked to enter the access code listed below, as well as some personal information. Please follow the directions to create your username and password.     Your access code is: [email or web address]  Expires: 2018 11:11 AM     Your access code will  in 90 days. If you need help or a new code, please call your [place] clinic or [phone].        Care EveryWhere ID     This is your Care EveryWhere ID. This could be used by other organizations to access your [facility] medical records  [id number]        Equal Access to Services     NGUYEN HICKEY AH: Hadii sacha Hein, wafridada luqadaha, qaybta kaalmada adejoy, orlando pichardo. So [facility] [phone].    ATENCIÓN: Si habla español, tiene a grant disposición servicios gratuitos de asistencia lingüística. Llame al [phone].    We comply with applicable federal civil rights " laws and [place] laws. We do not discriminate on the basis of race, color, national origin, age, disability, sex, sexual orientation, or gender identity.

## 2018-08-06 ASSESSMENT — ANXIETY QUESTIONNAIRES
6. BECOMING EASILY ANNOYED OR IRRITABLE: SEVERAL DAYS
5. BEING SO RESTLESS THAT IT IS HARD TO SIT STILL: SEVERAL DAYS
GAD7 TOTAL SCORE: 15
7. FEELING AFRAID AS IF SOMETHING AWFUL MIGHT HAPPEN: NEARLY EVERY DAY
3. WORRYING TOO MUCH ABOUT DIFFERENT THINGS: MORE THAN HALF THE DAYS
1. FEELING NERVOUS, ANXIOUS, OR ON EDGE: NEARLY EVERY DAY
IF YOU CHECKED OFF ANY PROBLEMS ON THIS QUESTIONNAIRE, HOW DIFFICULT HAVE THESE PROBLEMS MADE IT FOR YOU TO DO YOUR WORK, TAKE CARE OF THINGS AT HOME, OR GET ALONG WITH OTHER PEOPLE: SOMEWHAT DIFFICULT
2. NOT BEING ABLE TO STOP OR CONTROL WORRYING: NEARLY EVERY DAY

## 2018-08-06 ASSESSMENT — PATIENT HEALTH QUESTIONNAIRE - PHQ9: 5. POOR APPETITE OR OVEREATING: MORE THAN HALF THE DAYS

## 2018-08-06 NOTE — PROGRESS NOTES
Progress Note    Client Name: Suzie Castillo  Date:8/3/18               Service Type: Individual      Session Start Time: 130pm    Session End Time:  220      Session Length: 50     Session #:  20     Attendees: Client attended alone    Treatment Plan Last Reviewed: today  PHQ-9 / TWYLA-7 : 14;15  DATA      Progress Since Last Session (Related to Symptoms / Goals / Homework):   Symptoms: stable    Homework: n/a      Episode of Care Goals: Satisfactory progress - PREPARATION (Decided to change - considering how); Intervened by negotiating a change plan and determining options / strategies for behavior change, identifying triggers, exploring social supports, and working towards setting a date to begin behavior change.  Used safety plan ideas with a close friend. Had begun dating someone and had 3 good dates but then was ghosted which put ct in a tailspin of shame and self blame. Took her 3 days to recovery and she experienced painful rumination with brief passive SI.  Read Brene Brown as well which also help build resiliency.       Current / Ongoing Stressors and Concerns:  Breakup of 6 year relationship; grief issues; financial stressors. Good use of reviewing options for coping and reaching out vs isolating.       Treatment Objective(s) Addressed in This Session:   management of mood and procesing grief. Wants to undestand patterns underpinning dysfunction in relaitonships.   Historic: Working on seeing patterns in systems she moves in. Much more confident and feeling like she can be her authentic self again. Working on recognizing what she wants and needs and deserves out of relationship. Agreeing to continue to resource Brene Brown material and challenge shame narraitves.   Intervention:   Systems/CBT. Build self awareness. Understanding role that thinking has in creation and maintenance of mood. Behaviorally acting in more assertive ways with encouragement. Willing  to take communication risks she hasnt taken in past relationships with positive benefit so far. Able to distribute safety plan to trusted others; overall feeling good about her growing ability to cope.        ASSESSMENT: Current Emotional / Mental Status (status of significant symptoms):   Risk status (Self / Other harm or suicidal ideation)   Client denies current fears or concerns for personal safety.   Client reports the following current or recent suicidal ideation or behaviors: Passive SI out of frustration and shame.   Client denies current or recent homicidal ideation or behaviors.   Client denies current or recent self injurious behavior or ideation.   Client denies other safety concerns.   A safety and risk management plan has been developed including: Client consented to co-developed safety plan.  [facility]'s safety and risk management plan was completed.  Client agreed to use safety plan should any safety concerns arise.  A copy was given to the patient.     Appearance:   Appropriate    Eye Contact:   Good    Psychomotor Behavior: Normal    Attitude:   Cooperative    Orientation:   All   Speech    Rate / Production: Normal     Volume:  Normal    Mood:    Anxious more angry and confident.   Affect:    Appropriate    Thought Content:  Clear    Thought Form:  Coherent  Logical    Insight:    Good      Medication Review:   No changes to current psychiatric medication(s)     Medication Compliance:   Yes     Changes in Health Issues:   None reported     Chemical Use Review:   Substance Use: Chemical use reviewed, no active concerns identified      Tobacco Use: No current tobacco use.       Collateral Reports Completed:   Not Applicable    PLAN: (Client Tasks / Therapist Tasks / Other)  Continue as planned. Returns in 2-3 weeks for monitoring and re engagment in therapy.    Katharina Reyez, [facility], 8/6/2018                                                              ________________________________________________________________________    Treatment Plan    Client's Name: Suzie Castillo  YOB: 1980    Date: 6/30/2017      DSM-V Diagnoses: 311 (F32.8) Other/unspec. Depressive Disorder  Psychosocial / Contextual Factors: Good social/family supports  WHODAS: see intake.    Referral / Collaboration:  Referral to another professional/service is not indicated at this time..    Anticipated number of session or this episode of care: evaluate every 90 days.      MeasurableTreatment Goal(s) related to diagnosis / functional impairment(s)  Goal 1: Client will build self awareness.    I will know I've met my goal when I understand better what went wrong.      Objective #A (Client Action)    Client will be able to ID and chanllenge patterns in thinking feeling and behavior that are self defeating..  Status: new     Intervention(s)  Therapist will teach CBT/mindfulness..    Objective #B  Client will process losses and greif..  Status: new     Intervention(s)  Therapist will provide education/support and resources as indicated..    Objective #C  Client will Engage in 2 self care behaviors on weekly basis..  Status: new     Intervention(s)  Therapist will provide support and resources as indicated..    Client has reviewed and agreed to the above plan.      Katharina Reyez, [facility]  7/24/2018

## 2018-08-07 ASSESSMENT — PATIENT HEALTH QUESTIONNAIRE - PHQ9: SUM OF ALL RESPONSES TO PHQ QUESTIONS 1-9: 14

## 2018-08-07 ASSESSMENT — ANXIETY QUESTIONNAIRES: GAD7 TOTAL SCORE: 15

## 2018-08-08 NOTE — TELEPHONE ENCOUNTER
Suzie is calling again to check on status of forms. Please call her back at [phone] with status of forms. OK to leave detailed message.

## 2018-08-09 NOTE — TELEPHONE ENCOUNTER
Spoke with Dr. Doshi who states forms will be ready on Friday, 8-.  Patient notified on V. Mail.    Soheila Patel MA

## 2018-08-15 NOTE — TELEPHONE ENCOUNTER
Patient is calling on 8/15 asking where the forms are, I had the  look to see if ready for pickup and they are not.  So were they mailed?  Or Faxed?  Please call patient with location.

## 2018-08-20 ENCOUNTER — TELEPHONE (OUTPATIENT)
Dept: FAMILY MEDICINE | Facility: CLINIC | Age: 38
End: 2018-08-20

## 2018-09-10 DIAGNOSIS — F41.8 DEPRESSION WITH ANXIETY: ICD-10-CM

## 2018-09-11 NOTE — TELEPHONE ENCOUNTER
Requested Prescriptions   Pending Prescriptions Disp Refills     LORazepam (ATIVAN) 0.5 MG tablet [Pharmacy Med Name: LORAZEPAM 0.5MG TABLETS] 20 tablet 0     Sig: TAKE 1 TO 2 TABLETS BY MOUTH TWICE DAILY AS NEEDED FOR ANXIETY    There is no refill protocol information for this order        Last Written Prescription Date:  07/26/18  Last Fill Quantity: 20,  # refills: 0   Last office visit: 6/26/2018 with prescribing provider:     Future Office Visit:   Next 5 appointments (look out 90 days)     Sep 20, 2018  1:00 PM CDT   Return Visit with MIS Reid   [facility] ([facility])    [address]  [facility] [address] [phone]                 Thais Anderson MA

## 2018-09-12 RX ORDER — LORAZEPAM 0.5 MG/1
TABLET ORAL
Qty: 20 TABLET | Refills: 0 | Status: SHIPPED | OUTPATIENT
Start: 2018-09-12 | End: 2018-10-19

## 2018-09-12 NOTE — TELEPHONE ENCOUNTER
Routing refill request to provider for review/approval because:  Drug not on the [facility] refill protocol     Medication(s): Lorazepam 0.5mg.   Maximum quantity per month: 20 tablets  Clinic visit frequency required: Q 6  months     Controlled substance agreement on file: No  Benzodiazepine use reviewed by psychiatry:  No    Last [facility] website verification:  done on 07/26/2018   [email or web address]     Thank you,  Rome HAYES RN

## 2018-09-20 ENCOUNTER — OFFICE VISIT (OUTPATIENT)
Dept: PSYCHOLOGY | Facility: CLINIC | Age: 38
End: 2018-09-20

## 2018-09-20 DIAGNOSIS — F41.9 ANXIETY AND DEPRESSION: Primary | ICD-10-CM

## 2018-09-20 DIAGNOSIS — F32.A ANXIETY AND DEPRESSION: Primary | ICD-10-CM

## 2018-09-20 PROCEDURE — 90834 PSYTX W PT 45 MINUTES: CPT | Performed by: SOCIAL WORKER

## 2018-09-20 ASSESSMENT — ANXIETY QUESTIONNAIRES
IF YOU CHECKED OFF ANY PROBLEMS ON THIS QUESTIONNAIRE, HOW DIFFICULT HAVE THESE PROBLEMS MADE IT FOR YOU TO DO YOUR WORK, TAKE CARE OF THINGS AT HOME, OR GET ALONG WITH OTHER PEOPLE: SOMEWHAT DIFFICULT
6. BECOMING EASILY ANNOYED OR IRRITABLE: SEVERAL DAYS
GAD7 TOTAL SCORE: 7
5. BEING SO RESTLESS THAT IT IS HARD TO SIT STILL: MORE THAN HALF THE DAYS
7. FEELING AFRAID AS IF SOMETHING AWFUL MIGHT HAPPEN: NOT AT ALL
1. FEELING NERVOUS, ANXIOUS, OR ON EDGE: SEVERAL DAYS
2. NOT BEING ABLE TO STOP OR CONTROL WORRYING: SEVERAL DAYS
3. WORRYING TOO MUCH ABOUT DIFFERENT THINGS: SEVERAL DAYS

## 2018-09-20 ASSESSMENT — PATIENT HEALTH QUESTIONNAIRE - PHQ9: 5. POOR APPETITE OR OVEREATING: SEVERAL DAYS
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
## 2018-09-20 NOTE — MR AVS SNAPSHOT
"                  MRN:[id number]                      After Visit Summary   2018    Suzie Castillo    MRN: [id number]           Visit Information        Provider Department      2018 1:00 PM Katharina Reyez, [facility] Generic      Your next 10 appointments already scheduled     Oct 25, 2018 12:00 PM CDT   Return Visit with Katharina Reyez [facility] ([facility])    [address]  [facility] [address]   [phone]            2018  4:00 PM CST   Return Visit with Katharina Reyez [facility] ([facility])    [address]  [facility] [address] [phone]              MyChart Information     [employer]Like[facility] lets you send messages to your doctor, view your test results, renew your prescriptions, schedule appointments and more. To sign up, go to [email or web address] . Click on \"Log in\" on the left side of the screen, which will take you to the Welcome page. Then click on \"Sign up Now\" on the right side of the page.     You will be asked to enter the access code listed below, as well as some personal information. Please follow the directions to create your username and password.     Your access code is: [email or web address]  Expires: 2018 11:11 AM     Your access code will  in 90 days. If you need help or a new code, please call your [facility] clinic or [phone].        Care EveryWhere ID     This is your Care EveryWhere ID. This could be used by other organizations to access your [facility] medical records  [id number]        Equal Access to Services     LUDY HICKEY AH: Hadii sacha Hein, wafridada luqadaha, qaybta kaalmada adeartieyada, orlando pichardo. So [facility] [phone].    ATENCIÓN: Si habla español, tiene a grant disposición servicios gratuitos de asistencia lingüística. Llame al [phone].    We comply with applicable federal civil rights " laws and [place] laws. We do not discriminate on the basis of race, color, national origin, age, disability, sex, sexual orientation, or gender identity.

## 2018-09-21 ASSESSMENT — PATIENT HEALTH QUESTIONNAIRE - PHQ9: SUM OF ALL RESPONSES TO PHQ QUESTIONS 1-9: 5

## 2018-09-21 ASSESSMENT — ANXIETY QUESTIONNAIRES: GAD7 TOTAL SCORE: 7

## 2018-10-04 ENCOUNTER — TELEPHONE (OUTPATIENT)
Dept: FAMILY MEDICINE | Facility: CLINIC | Age: 38
End: 2018-10-04

## 2018-10-04 NOTE — TELEPHONE ENCOUNTER
Reason for call:  Patient reporting a symptom    Symptom or request: shivers and muscle tension    Duration (how long have symptoms been present): over the past few months    Have you been treated for this before? No    Additional comments: patient recently had the dose of her medication-citalopram (CELEXA) 20 MG tablet reduced from 40 MG to 20 MG.  Since that change, she has been experiencing these symptoms.  She thought they would go away, but have not.  Wondering if this is a reaction to this change?  Should she be seen for this?    Pt. Did schedule an OV for 10/10 with Dr. Doshi.    Please call to advise    Phone Number patient can be reached at:  Home number on file [phone] (home)    Best Time:  anytime    Can we leave a detailed message on this number:  YES    Call taken on 10/4/2018 at 1:32 PM by Celsa Welch.

## 2018-10-05 NOTE — TELEPHONE ENCOUNTER
Huddled with Hazel Lunsford NP regarding pt's sxs of shivers and muscle tension about 10 times a day X 1 month. Pt states that sxs occur in the afternoon. Pt does not loose consciousness when she is having sxs. Pt reports that her citalopram has recently been reduced from 40-20 mg. Hazel states that this can probably wait until  sees her for an OV on 10/10/18. Pt advised to seek medical attention if sxs worsen in any way.Pt understood.

## 2018-10-19 DIAGNOSIS — F32.A ANXIETY AND DEPRESSION: ICD-10-CM

## 2018-10-19 DIAGNOSIS — F41.9 ANXIETY AND DEPRESSION: ICD-10-CM

## 2018-10-19 DIAGNOSIS — F41.8 DEPRESSION WITH ANXIETY: ICD-10-CM

## 2018-10-19 NOTE — TELEPHONE ENCOUNTER
Last Written Prescription Date:  09/12/2018  Last Fill Quantity: 20,  # refills: 0   Last office visit: 6/26/2018 with prescribing provider:  Glenda Booth Office Visit:   Next 5 appointments (look out 90 days)     Oct 25, 2018 12:00 PM CDT   Return Visit with Katharina Reyez [facility] ([facility])    [address] [place] [address] [address]  [facility] [address]   [phone]            Nov 06, 2018  4:00 PM CST   Return Visit with Katharina Reyez [facility] ([facility])    [address]  [facility] [address]   [phone]                   Requested Prescriptions   Pending Prescriptions Disp Refills     LORazepam (ATIVAN) 0.5 MG tablet [Pharmacy Med Name: LORAZEPAM 0.5MG TABLETS] 20 tablet 0     Sig: TAKE 1-2 TABLETS BY MOUTH TWICE DAILY AS NEEDED FOR ANXIETY    There is no refill protocol information for this order

## 2018-10-22 NOTE — TELEPHONE ENCOUNTER
Patient is followed by ADELINA ALCANTAR for ongoing prescription of benzodiazepines.  All refills should be approved by this provider, or covering partner.    Medication(s): Lorazepam 0.5mg.   Maximum quantity per month: #20  Clinic visit frequency required: Q 6  months     Controlled substance agreement on file: No  Benzodiazepine use reviewed by psychiatry:  No    Last [facility] website verification:  done on 10/22/2018   [email or web address]

## 2018-10-23 RX ORDER — LORAZEPAM 0.5 MG/1
TABLET ORAL
Qty: 20 TABLET | Refills: 0 | Status: SHIPPED | OUTPATIENT
Start: 2018-10-23 | End: 2018-12-04

## 2018-11-06 ENCOUNTER — OFFICE VISIT (OUTPATIENT)
Dept: PSYCHOLOGY | Facility: CLINIC | Age: 38
End: 2018-11-06

## 2018-11-06 DIAGNOSIS — F32.A ANXIETY AND DEPRESSION: Primary | ICD-10-CM

## 2018-11-06 DIAGNOSIS — F41.9 ANXIETY AND DEPRESSION: Primary | ICD-10-CM

## 2018-11-06 PROCEDURE — 90834 PSYTX W PT 45 MINUTES: CPT | Performed by: SOCIAL WORKER

## 2018-11-06 NOTE — MR AVS SNAPSHOT
"                  MRN:[id number]                      After Visit Summary   2018    Suzie Castillo    MRN: [id number]           Visit Information        Provider Department      2018 4:00 PM Katharina Reyez, [facility] Generic      Your next 10 appointments already scheduled     Dec 19, 2018  5:00 PM CST   Return Visit with Katharina Reyez [facility] ([facility])    [address]  [facility] [address] [phone]            2019  5:00 PM CST   Return Visit with Katharina Reyez [facility] ([facility])    [address]  [facility] [address] [phone]              MyChart Information     [employer] lets you send messages to your doctor, view your test results, renew your prescriptions, schedule appointments and more. To sign up, go to [email or web address] . Click on \"Log in\" on the left side of the screen, which will take you to the Welcome page. Then click on \"Sign up Now\" on the right side of the page.     You will be asked to enter the access code listed below, as well as some personal information. Please follow the directions to create your username and password.     Your access code is: [email or web address]  Expires: 2019  1:33 PM     Your access code will  in 90 days. If you need help or a new code, please call your [facility] clinic or [phone].        Care EveryWhere ID     This is your Care EveryWhere ID. This could be used by other organizations to access your [facility] medical records  [id number]        Equal Access to Services     NGUYEN HICKEY AH: Hadii sacha Hein, wafridada lurayrayadaha, qaybta kaalmada berkley, orlando pichardo. So [facility] [phone].    ATENCIÓN: Si habla español, tiene a grant disposición servicios gratuitos de asistencia lingüística. Llame al [phone].    We comply with applicable federal civil rights " laws and [place] laws. We do not discriminate on the basis of race, color, national origin, age, disability, sex, sexual orientation, or gender identity.

## 2018-11-07 ASSESSMENT — ANXIETY QUESTIONNAIRES
5. BEING SO RESTLESS THAT IT IS HARD TO SIT STILL: SEVERAL DAYS
7. FEELING AFRAID AS IF SOMETHING AWFUL MIGHT HAPPEN: NOT AT ALL
6. BECOMING EASILY ANNOYED OR IRRITABLE: SEVERAL DAYS
3. WORRYING TOO MUCH ABOUT DIFFERENT THINGS: SEVERAL DAYS
IF YOU CHECKED OFF ANY PROBLEMS ON THIS QUESTIONNAIRE, HOW DIFFICULT HAVE THESE PROBLEMS MADE IT FOR YOU TO DO YOUR WORK, TAKE CARE OF THINGS AT HOME, OR GET ALONG WITH OTHER PEOPLE: SOMEWHAT DIFFICULT
2. NOT BEING ABLE TO STOP OR CONTROL WORRYING: SEVERAL DAYS
1. FEELING NERVOUS, ANXIOUS, OR ON EDGE: MORE THAN HALF THE DAYS
GAD7 TOTAL SCORE: 8

## 2018-11-07 ASSESSMENT — PATIENT HEALTH QUESTIONNAIRE - PHQ9
5. POOR APPETITE OR OVEREATING: MORE THAN HALF THE DAYS
SUM OF ALL RESPONSES TO PHQ QUESTIONS 1-9: 5
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
## 2018-11-08 ASSESSMENT — ANXIETY QUESTIONNAIRES: GAD7 TOTAL SCORE: 8

## 2018-12-04 DIAGNOSIS — F41.8 DEPRESSION WITH ANXIETY: ICD-10-CM

## 2018-12-04 NOTE — TELEPHONE ENCOUNTER
LORazepam (ATIVAN) 0.5 MG tablet  Last Written Prescription Date:  10/23/18  Last Fill Quantity: 20,  # refills: 0   Last office visit: 6/26/2018 with prescribing provider:  yudelka   Future Office Visit:   Next 5 appointments (look out 90 days)     Dec 19, 2018  5:00 PM CST   Return Visit with Katharina Reyez, [facility] ([facility])    [address]  [facility] [address]   [phone]            Jan 02, 2019  5:00 PM CST   Return Visit with Katharina Reyez, [facility] ([facility])    [address]  [facility] [address]   [phone]                       Requested Prescriptions   Pending Prescriptions Disp Refills     LORazepam (ATIVAN) 0.5 MG tablet [Pharmacy Med Name: LORAZEPAM 0.5MG TABLETS] 20 tablet 0     Sig: TAKE 1-2 TABLETS BY MOUTH TWICE DAILY AS NEEDED FOR ANXIETY    There is no refill protocol information for this order

## 2018-12-06 RX ORDER — LORAZEPAM 0.5 MG/1
TABLET ORAL
Qty: 20 TABLET | Refills: 0 | Status: SHIPPED | OUTPATIENT
Start: 2018-12-06 | End: 2019-02-19

## 2018-12-19 ENCOUNTER — OFFICE VISIT (OUTPATIENT)
Dept: PSYCHOLOGY | Facility: CLINIC | Age: 38
End: 2018-12-19

## 2018-12-19 DIAGNOSIS — F32.A ANXIETY AND DEPRESSION: Primary | ICD-10-CM

## 2018-12-19 DIAGNOSIS — F41.9 ANXIETY AND DEPRESSION: Primary | ICD-10-CM

## 2018-12-19 PROCEDURE — 90834 PSYTX W PT 45 MINUTES: CPT | Performed by: SOCIAL WORKER

## 2018-12-20 ASSESSMENT — ANXIETY QUESTIONNAIRES
6. BECOMING EASILY ANNOYED OR IRRITABLE: SEVERAL DAYS
GAD7 TOTAL SCORE: 7
IF YOU CHECKED OFF ANY PROBLEMS ON THIS QUESTIONNAIRE, HOW DIFFICULT HAVE THESE PROBLEMS MADE IT FOR YOU TO DO YOUR WORK, TAKE CARE OF THINGS AT HOME, OR GET ALONG WITH OTHER PEOPLE: SOMEWHAT DIFFICULT
3. WORRYING TOO MUCH ABOUT DIFFERENT THINGS: SEVERAL DAYS
5. BEING SO RESTLESS THAT IT IS HARD TO SIT STILL: NOT AT ALL
1. FEELING NERVOUS, ANXIOUS, OR ON EDGE: MORE THAN HALF THE DAYS
2. NOT BEING ABLE TO STOP OR CONTROL WORRYING: MORE THAN HALF THE DAYS
7. FEELING AFRAID AS IF SOMETHING AWFUL MIGHT HAPPEN: NOT AT ALL

## 2018-12-20 ASSESSMENT — PATIENT HEALTH QUESTIONNAIRE - PHQ9
SUM OF ALL RESPONSES TO PHQ QUESTIONS 1-9: 8
5. POOR APPETITE OR OVEREATING: SEVERAL DAYS
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
## 2018-12-21 ASSESSMENT — ANXIETY QUESTIONNAIRES: GAD7 TOTAL SCORE: 7

## 2019-01-02 ENCOUNTER — OFFICE VISIT (OUTPATIENT)
Dept: PSYCHOLOGY | Facility: CLINIC | Age: 39
End: 2019-01-02

## 2019-01-02 DIAGNOSIS — F32.A ANXIETY AND DEPRESSION: Primary | ICD-10-CM

## 2019-01-02 DIAGNOSIS — F41.9 ANXIETY AND DEPRESSION: Primary | ICD-10-CM

## 2019-01-02 PROCEDURE — 90834 PSYTX W PT 45 MINUTES: CPT | Performed by: SOCIAL WORKER

## 2019-01-03 ASSESSMENT — ANXIETY QUESTIONNAIRES
GAD7 TOTAL SCORE: 9
1. FEELING NERVOUS, ANXIOUS, OR ON EDGE: NEARLY EVERY DAY
3. WORRYING TOO MUCH ABOUT DIFFERENT THINGS: MORE THAN HALF THE DAYS
5. BEING SO RESTLESS THAT IT IS HARD TO SIT STILL: NOT AT ALL
2. NOT BEING ABLE TO STOP OR CONTROL WORRYING: SEVERAL DAYS
IF YOU CHECKED OFF ANY PROBLEMS ON THIS QUESTIONNAIRE, HOW DIFFICULT HAVE THESE PROBLEMS MADE IT FOR YOU TO DO YOUR WORK, TAKE CARE OF THINGS AT HOME, OR GET ALONG WITH OTHER PEOPLE: SOMEWHAT DIFFICULT
7. FEELING AFRAID AS IF SOMETHING AWFUL MIGHT HAPPEN: SEVERAL DAYS
6. BECOMING EASILY ANNOYED OR IRRITABLE: SEVERAL DAYS

## 2019-01-03 ASSESSMENT — PATIENT HEALTH QUESTIONNAIRE - PHQ9
SUM OF ALL RESPONSES TO PHQ QUESTIONS 1-9: 5
5. POOR APPETITE OR OVEREATING: SEVERAL DAYS
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
## 2019-01-04 ASSESSMENT — ANXIETY QUESTIONNAIRES: GAD7 TOTAL SCORE: 9

## 2019-01-24 ENCOUNTER — TELEPHONE (OUTPATIENT)
Dept: FAMILY MEDICINE | Facility: CLINIC | Age: 39
End: 2019-01-24

## 2019-01-24 NOTE — TELEPHONE ENCOUNTER
Panel Management Review      Patient has the following on her problem list:     Depression / Dysthymia review    Measure:  Needs PHQ-9 score of 4 or less during index window.  Administer PHQ-9 and if score is 5 or more, send encounter to provider for next steps.    5 - 7 month window range:     PHQ-9 SCORE 11/7/2018 12/20/2018 1/3/2019   PHQ-9 Total Score 5 8 5       If PHQ-9 recheck is 5 or more, route to provider for next steps.    Patient is due for:  DAP      Composite cancer screening  Chart review shows that this patient is due/due soon for the following Pap Smear  Summary:    Patient is due/failing the following:   DAP and PAP    Action needed:   Patient needs office visit for f/u depression.  Also find out if patient has had PAP: when, where, results.    Type of outreach:    Phone, left message for patient to call back.     Questions for provider review:                                                                                                                                        Thais Anderson MA       Chart routed to  .

## 2019-02-12 DIAGNOSIS — F41.8 DEPRESSION WITH ANXIETY: ICD-10-CM

## 2019-02-12 NOTE — TELEPHONE ENCOUNTER
"citalopram (CELEXA) 20 MG tablet  Last Written Prescription Date:  2/16/18  Last Fill Quantity: 90,  # refills: 1   Last office visit: 6/26/2018 with prescribing provider:  Glenda    Future Office Visit:   Next 5 appointments (look out 90 days)    Feb 27, 2019  5:00 PM CST  Return Visit with Katharina Reyez, [facility] ([facility] [place]) [address]  [facility] [address]  [phone]   Mar 05, 2019  5:00 PM CST  Office Visit with Yusuf Doshi MD  [facility] ([facility]) [address] [facility] [address]  [phone]   Mar 27, 2019  5:00 PM CDT  Return Visit with Katharina Reyez, [facility] ([facility]) [address]  [place] [address]  [phone]               Requested Prescriptions   Pending Prescriptions Disp Refills     citalopram (CELEXA) 40 MG tablet [Pharmacy Med Name: CITALOPRAM 40MG TABLETS] 90 tablet 0     Sig: TAKE 1 TABLET(40 MG) BY MOUTH DAILY    SSRIs Protocol Passed - 2/12/2019 10:19 AM       Passed - Recent (12 mo) or future (30 days) visit within the authorizing provider's specialty    Patient had office visit in the last 12 months or has a visit in the next 30 days with authorizing provider or within the authorizing provider's specialty.  See \"Patient Info\" tab in inbasket, or \"Choose Columns\" in Meds & Orders section of the refill encounter.             Passed - Medication is active on med list       Passed - Patient is age 18 or older       Passed - No active pregnancy on record       Passed - No positive pregnancy test in last 12 months          "

## 2019-02-13 RX ORDER — CITALOPRAM HYDROBROMIDE 40 MG/1
TABLET ORAL
Qty: 90 TABLET | Refills: 0 | OUTPATIENT
Start: 2019-02-13

## 2019-02-13 NOTE — TELEPHONE ENCOUNTER
Routing refill request to provider for review/approval because:  A break in medication  Per below patient taking 20mg daily  Refused 40mg tablets to pharmacy and re-pended Rx for 20mg tabs     Daphne LAIRD RN

## 2019-02-13 NOTE — TELEPHONE ENCOUNTER
Request is for 40mg Celexa daily  Last Rx was for 20mg daily, but 6 month Rx sent 2/16/18  Prior to this patient was taking 40mg daily    Left message asking patient to call back - need to verify what dosing patient is taking     Daphne LAIRD RN        PHQ-9 SCORE 11/7/2018 12/20/2018 1/3/2019   PHQ-9 Total Score 5 8 5

## 2019-02-14 RX ORDER — CITALOPRAM HYDROBROMIDE 20 MG/1
20 TABLET ORAL DAILY
Qty: 90 TABLET | Refills: 0 | Status: SHIPPED | OUTPATIENT
Start: 2019-02-14 | End: 2020-04-09

## 2019-02-18 DIAGNOSIS — F41.8 DEPRESSION WITH ANXIETY: ICD-10-CM

## 2019-02-19 DIAGNOSIS — F41.8 DEPRESSION WITH ANXIETY: ICD-10-CM

## 2019-02-19 NOTE — TELEPHONE ENCOUNTER
has not been checked    Controlled Substance Refill Request for Lorazepam  Problem List Complete:  No     PROVIDER TO CONSIDER COMPLETION OF PROBLEM LIST AND OVERVIEW/CONTROLLED SUBSTANCE AGREEMENT    Last Written Prescription Date:  12-6-18  Last Fill Quantity: 20,   # refills: 0    THE MOST RECENT OFFICE VISIT MUST BE WITHIN THE PAST 3 MONTHS. AT LEAST ONE FACE TO FACE VISIT MUST OCCUR EVERY 6 MONTHS. ADDITIONAL VISITS CAN BE VIRTUAL.  (THIS STATEMENT SHOULD BE DELETED.)    Last Office Visit with [facility] primary care provider: 6-26-18 Glenda    Future Office visit:   Next 5 appointments (look out 90 days)    Feb 27, 2019  5:00 PM CST  Return Visit with Katharina Reyez, [facility] ([facility]) [address]  [facility] [address]  [phone]   Mar 05, 2019  5:00 PM CST  Office Visit with Yusuf Doshi MD  [facility] [address] [place] [address]  [phone]   Mar 27, 2019  5:00 PM CDT  Return Visit with Katharina Reyez, [facility] ([facility]) [address]  [facility] [address]  [phone]          Controlled substance agreement:   Encounter-Level CSA:    There are no encounter-level csa.     Patient-Level CSA:    There are no patient-level csa.         Last Urine Drug Screen: No results found for: CDAUT, No results found for: COMDAT, No results found for: THC13, PCP13, COC13, MAMP13, OPI13, AMP13, BZO13, TCA13, MTD13, BAR13, OXY13, PPX13, BUP13     Processing:  Fax Rx to [address] [facility] pharmacy     [email or web address]       checked in past 3 months?  No, route to PAM Méndez RT (R)

## 2019-02-19 NOTE — TELEPHONE ENCOUNTER
"Pending Prescriptions:                       Disp   Refills    citalopram (CELEXA) 20 MG tablet [Pharmac*90 tab*0            Sig: TAKE 1 TABLET(20 MG) BY MOUTH DAILY    Last Written Prescription Date:  2/14/19  Last Fill Quantity: 90,  # refills: 0   Last office visit: 6/26/2018 with prescribing provider:     Future Office Visit:   Next 5 appointments (look out 90 days)    Feb 27, 2019  5:00 PM CST  Return Visit with Katharina Reyez, [facility] ([facility]) [address]  [facility] [address]  [phone]   Mar 05, 2019  5:00 PM CST  Office Visit with Yusuf Doshi MD  [facility] ([facility]) [address] [facility] [address]  [phone]   Mar 27, 2019  5:00 PM CDT  Return Visit with Katharina Reyez [facility] ([facility]) [address]  [facility] [address]  [phone]         Requested Prescriptions   Pending Prescriptions Disp Refills     citalopram (CELEXA) 20 MG tablet [Pharmacy Med Name: CITALOPRAM 20MG TABLETS] 90 tablet 0     Sig: TAKE 1 TABLET(20 MG) BY MOUTH DAILY    SSRIs Protocol Passed - 2/18/2019 10:40 PM       Passed - Recent (12 mo) or future (30 days) visit within the authorizing provider's specialty    Patient had office visit in the last 12 months or has a visit in the next 30 days with authorizing provider or within the authorizing provider's specialty.  See \"Patient Info\" tab in inbasket, or \"Choose Columns\" in Meds & Orders section of the refill encounter.             Passed - Medication is active on med list       Passed - Patient is age 18 or older       Passed - No active pregnancy on record       Passed - No positive pregnancy test in last 12 months          "

## 2019-02-20 RX ORDER — CITALOPRAM HYDROBROMIDE 20 MG/1
TABLET ORAL
Start: 2019-02-20

## 2019-02-20 NOTE — TELEPHONE ENCOUNTER
Routing refill request to provider for review/approval because:  Drug not on the [facility] refill protocol   RN does not have  access and it should be checked.    CATERINA CampbellN, RN  [facility]

## 2019-02-22 RX ORDER — LORAZEPAM 0.5 MG/1
TABLET ORAL
Qty: 20 TABLET | Refills: 0 | Status: SHIPPED | OUTPATIENT
Start: 2019-02-22 | End: 2019-03-28

## 2019-02-22 NOTE — TELEPHONE ENCOUNTER
Rx for Lorazepam faxed to [id number] [place] [address])    Rocío Méndez RT (R)

## 2019-02-27 ENCOUNTER — OFFICE VISIT (OUTPATIENT)
Dept: PSYCHOLOGY | Facility: CLINIC | Age: 39
End: 2019-02-27

## 2019-02-27 DIAGNOSIS — F41.9 ANXIETY AND DEPRESSION: Primary | ICD-10-CM

## 2019-02-27 DIAGNOSIS — F32.A ANXIETY AND DEPRESSION: Primary | ICD-10-CM

## 2019-02-27 PROCEDURE — 90834 PSYTX W PT 45 MINUTES: CPT | Performed by: SOCIAL WORKER

## 2019-02-27 ASSESSMENT — ANXIETY QUESTIONNAIRES
1. FEELING NERVOUS, ANXIOUS, OR ON EDGE: SEVERAL DAYS
7. FEELING AFRAID AS IF SOMETHING AWFUL MIGHT HAPPEN: NOT AT ALL
GAD7 TOTAL SCORE: 7
6. BECOMING EASILY ANNOYED OR IRRITABLE: SEVERAL DAYS
2. NOT BEING ABLE TO STOP OR CONTROL WORRYING: SEVERAL DAYS
5. BEING SO RESTLESS THAT IT IS HARD TO SIT STILL: SEVERAL DAYS
3. WORRYING TOO MUCH ABOUT DIFFERENT THINGS: MORE THAN HALF THE DAYS
IF YOU CHECKED OFF ANY PROBLEMS ON THIS QUESTIONNAIRE, HOW DIFFICULT HAVE THESE PROBLEMS MADE IT FOR YOU TO DO YOUR WORK, TAKE CARE OF THINGS AT HOME, OR GET ALONG WITH OTHER PEOPLE: SOMEWHAT DIFFICULT

## 2019-02-27 ASSESSMENT — PATIENT HEALTH QUESTIONNAIRE - PHQ9
5. POOR APPETITE OR OVEREATING: SEVERAL DAYS
SUM OF ALL RESPONSES TO PHQ QUESTIONS 1-9: 7

## 2019-02-28 ASSESSMENT — ANXIETY QUESTIONNAIRES: GAD7 TOTAL SCORE: 7
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
## 2019-03-19 ENCOUNTER — OFFICE VISIT (OUTPATIENT)
Dept: FAMILY MEDICINE | Facility: CLINIC | Age: 39
End: 2019-03-19

## 2019-03-19 VITALS
HEIGHT: 67 IN | BODY MASS INDEX: 31.55 KG/M2 | HEART RATE: 69 BPM | SYSTOLIC BLOOD PRESSURE: 99 MMHG | WEIGHT: 201 LBS | TEMPERATURE: 98.8 F | DIASTOLIC BLOOD PRESSURE: 68 MMHG | OXYGEN SATURATION: 100 %

## 2019-03-19 DIAGNOSIS — F43.23 ADJUSTMENT DISORDER WITH MIXED ANXIETY AND DEPRESSED MOOD: ICD-10-CM

## 2019-03-19 DIAGNOSIS — F41.9 ANXIETY AND DEPRESSION: Primary | ICD-10-CM

## 2019-03-19 DIAGNOSIS — R53.83 FATIGUE, UNSPECIFIED TYPE: ICD-10-CM

## 2019-03-19 DIAGNOSIS — F32.A ANXIETY AND DEPRESSION: Primary | ICD-10-CM

## 2019-03-19 PROCEDURE — 84443 ASSAY THYROID STIM HORMONE: CPT | Performed by: INTERNAL MEDICINE

## 2019-03-19 PROCEDURE — 99213 OFFICE O/P EST LOW 20 MIN: CPT | Performed by: INTERNAL MEDICINE

## 2019-03-19 PROCEDURE — 36415 COLL VENOUS BLD VENIPUNCTURE: CPT | Performed by: INTERNAL MEDICINE

## 2019-03-19 RX ORDER — CITALOPRAM HYDROBROMIDE 20 MG/1
TABLET ORAL
Qty: 135 TABLET | Refills: 3 | Status: SHIPPED | OUTPATIENT
Start: 2019-03-19

## 2019-03-19 ASSESSMENT — MIFFLIN-ST. JEOR: SCORE: 1624.36

## 2019-03-19 NOTE — PROGRESS NOTES
SUBJECTIVE:   Suzie Castillo is a 38 year old female who presents to clinic today for the following health issues:      Abnormal Mood Symptoms      Description:   Depression: YES  Anxiety: YES    Accompanying Signs & Symptoms:  Still participating in activities that you used to enjoy: YES  Fatigue: not as bad  Irritability: no  Difficulty concentrating: YES  Changes in appetite: no   Problems with sleep: YES  Heart racing/beating fast : no   Thoughts of hurting yourself or others: none    History:   Recent stress: YES  Prior depression hospitalization: None  Family history of depression: no  Family history of anxiety: no    Precipitating factors:   Alcohol/drug use: no    Alleviating factors:      Therapies Tried and outcome: Ativan (Lorezepam) and Celexa (Citalopram)  As noted above her anxious mood and depression have improved slightly.  Darryl 7 and PHQ 9 survey is reviewed.  Her feeling of well-being was graded as a 5 out of 10.    Complains of hoarseness without feverishness chills or sweats.  She is having nasal drainage associated with postnasal drip and unclear whether this is a flulike illness or allergies.  No shortness of breath,hest pain or cough      Problem list and histories reviewed & adjusted, as indicated.  Additional history: as documented    Current Outpatient Medications   Medication Sig Dispense Refill     citalopram (CELEXA) 20 MG tablet Si/2 tabs po qd 135 tablet 3     citalopram (CELEXA) 20 MG tablet Take 1 tablet (20 mg) by mouth daily 90 tablet 0     Minocycline HCl (MINOCIN PO) Take by mouth 2 times daily       SPIRONOLACTONE PO Take by mouth 2 times daily       LORazepam (ATIVAN) 0.5 MG tablet TAKE 1 TO 2 TABLETS BY MOUTH TWICE DAILY AS NEEDED FOR ANXIETY 20 tablet 0     No Known Allergies    Reviewed and updated as needed this visit by clinical staff       Reviewed and updated as needed this visit by Provider         ROS:  Constitutional, HEENT, cardiovascular, pulmonary, gi  "and gu systems are negative, except as otherwise noted.    OBJECTIVE:     BP 99/68 (BP Location: Right arm, Cuff Size: Adult Large)   Pulse 69   Temp 98.8  F (37.1  C) (Tympanic)   Ht 1.702 m (5' 7\")   Wt 91.2 kg (201 lb)   SpO2 100%   BMI 31.48 kg/m    Body mass index is 31.48 kg/m .  GENERAL: healthy, alert and no distress  HEENT Minor posterior pharyngeal streaking  NECK: no adenopathy, no asymmetry, masses, or scars and thyroid normal to palpation  RESP: lungs clear to auscultation - no rales, rhonchi or wheezes  CV: regular rate and rhythm, normal S1 S2, no S3 or S4, no murmur, click or rub, no peripheral edema and peripheral pulses strong  ABDOMEN: soft, nontender, no hepatosplenomegaly, no masses and bowel sounds normal  MS: no gross musculoskeletal defects noted, no edema        ASSESSMENT/PLAN:             1. Anxiety and depression  Continue with as needed Ativan and increase the citalopram to 20 mg daily.  Return to clinic in 1 month to reevaluate.  - citalopram (CELEXA) 20 MG tablet; Si/2 tabs po qd  Dispense: 135 tablet; Refill: 3    2. Adjustment disorder with mixed anxiety and depressed mood    - citalopram (CELEXA) 20 MG tablet; Si/2 tabs po qd  Dispense: 135 tablet; Refill: 3    3. Fatigue, unspecified type    - TSH with free T4 reflex    4.  Allergic rhinitis  Recommend using as needed or regular Claritin and if she is having increasing nasal congestion she could use Flonase at at bedtime as well    Yusuf Doshi MD  [facility]    "

## 2019-03-19 NOTE — LETTER
[facility]  [address] [facility]  [address]  [place]  [address]  Tel: [phone]    April 1, 2019    Suzie Castillo  [address]   [facility] [address]        Dear Ms. Castillo,    Your thyroid test is normal.No changes are indicated.If you have any questions please call.    Sincerely,    Yusuf Doshi MD/MELINA          Enclosure: Lab Results  Results for orders placed or performed in visit on 03/19/19   TSH with free T4 reflex   Result Value Ref Range    TSH 1.77 0.40 - 4.00 mU/L

## 2019-03-20 LAB — TSH SERPL DL<=0.005 MIU/L-ACNC: 1.77 MU/L (ref 0.4–4)

## 2019-03-28 DIAGNOSIS — F41.8 DEPRESSION WITH ANXIETY: ICD-10-CM

## 2019-03-28 DIAGNOSIS — F32.A ANXIETY AND DEPRESSION: ICD-10-CM

## 2019-03-28 DIAGNOSIS — F41.9 ANXIETY AND DEPRESSION: ICD-10-CM

## 2019-03-28 NOTE — TELEPHONE ENCOUNTER
Pending Prescriptions:                       Disp   Refills    LORazepam (ATIVAN) 0.5 MG tablet [Pharmac*20 tab*0            Sig: TAKE 1 TO 2 TABLETS BY MOUTH TWICE DAILY AS           NEEDED FOR ANXIETY      LORazepam (ATIVAN) 0.5 MG tablet  Last Written Prescription Date:  2/22/19  Last Fill Quantity: 20,  # refills: 0   Last office visit: 3/19/2019 with prescribing provider:  Glenda    Future Office Visit:   Next 5 appointments (look out 90 days)    Apr 24, 2019  4:00 PM CDT  Return Visit with Katharina Reyez, [facility] ([facility]) [address]  [facility] [address]  [phone]           Requested Prescriptions   Pending Prescriptions Disp Refills     LORazepam (ATIVAN) 0.5 MG tablet [Pharmacy Med Name: LORAZEPAM 0.5MG TABLETS] 20 tablet 0     Sig: TAKE 1 TO 2 TABLETS BY MOUTH TWICE DAILY AS NEEDED FOR ANXIETY    There is no refill protocol information for this order

## 2019-03-29 RX ORDER — LORAZEPAM 0.5 MG/1
TABLET ORAL
Qty: 20 TABLET | Refills: 0 | Status: SHIPPED | OUTPATIENT
Start: 2019-03-29 | End: 2019-05-23

## 2019-05-06 ASSESSMENT — ANXIETY QUESTIONNAIRES
1. FEELING NERVOUS, ANXIOUS, OR ON EDGE: NEARLY EVERY DAY
GAD7 TOTAL SCORE: 11
2. NOT BEING ABLE TO STOP OR CONTROL WORRYING: MORE THAN HALF THE DAYS
5. BEING SO RESTLESS THAT IT IS HARD TO SIT STILL: MORE THAN HALF THE DAYS
IF YOU CHECKED OFF ANY PROBLEMS ON THIS QUESTIONNAIRE, HOW DIFFICULT HAVE THESE PROBLEMS MADE IT FOR YOU TO DO YOUR WORK, TAKE CARE OF THINGS AT HOME, OR GET ALONG WITH OTHER PEOPLE: SOMEWHAT DIFFICULT
6. BECOMING EASILY ANNOYED OR IRRITABLE: SEVERAL DAYS
7. FEELING AFRAID AS IF SOMETHING AWFUL MIGHT HAPPEN: SEVERAL DAYS
3. WORRYING TOO MUCH ABOUT DIFFERENT THINGS: SEVERAL DAYS

## 2019-05-06 ASSESSMENT — PATIENT HEALTH QUESTIONNAIRE - PHQ9
5. POOR APPETITE OR OVEREATING: SEVERAL DAYS
SUM OF ALL RESPONSES TO PHQ QUESTIONS 1-9: 9

## 2019-05-07 ASSESSMENT — ANXIETY QUESTIONNAIRES: GAD7 TOTAL SCORE: 11

## 2019-05-10 ENCOUNTER — OFFICE VISIT (OUTPATIENT)
Dept: PSYCHOLOGY | Facility: CLINIC | Age: 39
End: 2019-05-10

## 2019-05-10 DIAGNOSIS — F41.9 ANXIETY AND DEPRESSION: Primary | ICD-10-CM

## 2019-05-10 DIAGNOSIS — F32.A ANXIETY AND DEPRESSION: Primary | ICD-10-CM

## 2019-05-10 PROCEDURE — 90834 PSYTX W PT 45 MINUTES: CPT | Performed by: SOCIAL WORKER

## 2019-05-10 ASSESSMENT — ANXIETY QUESTIONNAIRES
2. NOT BEING ABLE TO STOP OR CONTROL WORRYING: NOT AT ALL
GAD7 TOTAL SCORE: 5
6. BECOMING EASILY ANNOYED OR IRRITABLE: MORE THAN HALF THE DAYS
5. BEING SO RESTLESS THAT IT IS HARD TO SIT STILL: NOT AT ALL
7. FEELING AFRAID AS IF SOMETHING AWFUL MIGHT HAPPEN: NOT AT ALL
IF YOU CHECKED OFF ANY PROBLEMS ON THIS QUESTIONNAIRE, HOW DIFFICULT HAVE THESE PROBLEMS MADE IT FOR YOU TO DO YOUR WORK, TAKE CARE OF THINGS AT HOME, OR GET ALONG WITH OTHER PEOPLE: SOMEWHAT DIFFICULT
3. WORRYING TOO MUCH ABOUT DIFFERENT THINGS: SEVERAL DAYS
1. FEELING NERVOUS, ANXIOUS, OR ON EDGE: SEVERAL DAYS

## 2019-05-10 ASSESSMENT — PATIENT HEALTH QUESTIONNAIRE - PHQ9
SUM OF ALL RESPONSES TO PHQ QUESTIONS 1-9: 8
5. POOR APPETITE OR OVEREATING: SEVERAL DAYS
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
## 2019-05-11 ASSESSMENT — ANXIETY QUESTIONNAIRES: GAD7 TOTAL SCORE: 5

## 2019-05-23 DIAGNOSIS — F41.9 ANXIETY AND DEPRESSION: ICD-10-CM

## 2019-05-23 DIAGNOSIS — F32.A ANXIETY AND DEPRESSION: ICD-10-CM

## 2019-05-23 DIAGNOSIS — F41.8 DEPRESSION WITH ANXIETY: ICD-10-CM

## 2019-05-24 NOTE — TELEPHONE ENCOUNTER
Last Rx 3/29/19, #20 with 0 refills - Ativan 0.5mg, 1-2 tabs BID PRN anxiety     Last OV 3/19/19     checked 5/24/19 no concerns [place]     Routing refill request to provider for review/approval because:  Drug not on the [facility] refill protocol     Daphne LAIRD RN

## 2019-05-29 RX ORDER — LORAZEPAM 0.5 MG/1
TABLET ORAL
Qty: 20 TABLET | Refills: 0 | Status: SHIPPED | OUTPATIENT
Start: 2019-05-29 | End: 2019-07-17

## 2019-07-17 DIAGNOSIS — F43.23 ADJUSTMENT DISORDER WITH MIXED ANXIETY AND DEPRESSED MOOD: ICD-10-CM

## 2019-07-17 DIAGNOSIS — F41.8 DEPRESSION WITH ANXIETY: ICD-10-CM

## 2019-07-18 NOTE — TELEPHONE ENCOUNTER
Lorazepam 0.5mg      Last Written Prescription Date:  5/29/19  Last Fill Quantity: 20,   # refills: 0  Last Office Visit: 3/19/19  Future Office visit: none      Routing refill request to provider for review/approval because:  Drug not on the [facility], [facility] or [facility] refill protocol or controlled substance      Last [facility] website verification: 6/23/2019 [place]    [email or web address]       Daphne Perales RN on 7/18/2019 at 6:20 PM

## 2019-07-22 RX ORDER — LORAZEPAM 0.5 MG/1
TABLET ORAL
Qty: 20 TABLET | Refills: 0 | Status: SHIPPED | OUTPATIENT
Start: 2019-07-22 | End: 2019-09-20

## 2019-07-22 NOTE — TELEPHONE ENCOUNTER
RX Faxed to      [facility] [id number] [place], [place] - [address] AT [facility]

## 2019-09-20 DIAGNOSIS — F32.A ANXIETY AND DEPRESSION: ICD-10-CM

## 2019-09-20 DIAGNOSIS — F41.9 ANXIETY AND DEPRESSION: ICD-10-CM

## 2019-09-20 DIAGNOSIS — F41.8 DEPRESSION WITH ANXIETY: ICD-10-CM

## 2019-09-20 NOTE — TELEPHONE ENCOUNTER
Lorazepam 0.5mg       Last Written Prescription Date:  7/22/19  Last Fill Quantity: 20,   # refills: 0  Last Office Visit: 3/19/19  Future Office visit:       Routing refill request to provider for review/approval because:  Drug not on the [facility], [facility] or [facility] refill protocol or controlled substance    Last [facility] website verification: 9/20/19 [place]  [email or web address]    Daphne LAIRD RN

## 2019-09-21 ENCOUNTER — TELEPHONE (OUTPATIENT)
Dept: NURSING | Facility: CLINIC | Age: 39
End: 2019-09-21

## 2019-09-21 NOTE — TELEPHONE ENCOUNTER
38 y/o female calls about an NANI letter Dr. Doshi had made for her and was going to leave it at the  for her to  during Urgent Care hours today, [facility], she wants to confirm if this letter is ready for her if she comes in. RN called back line of Urgent Care clinic with no answer, advised patient that Clinic must be very busy, she will have to go there in person and see if it is ready.  Patient verbalized understanding of and agreement with plan and had no further questions.     Aileen Dunlap RN - [place] Nurse Advisor  09/21/2019   1:51PM

## 2019-09-22 RX ORDER — LORAZEPAM 0.5 MG/1
TABLET ORAL
Qty: 20 TABLET | Refills: 0 | Status: SHIPPED | OUTPATIENT
Start: 2019-09-22 | End: 2019-11-12

## 2019-09-23 NOTE — TELEPHONE ENCOUNTER
Pt called wanting to know if she can  the letter today, pt states this is time sensitive.     Anahi Bustamante on 9/23/2019 at 11:39 AM

## 2019-11-12 DIAGNOSIS — F41.8 DEPRESSION WITH ANXIETY: ICD-10-CM

## 2019-11-12 RX ORDER — LORAZEPAM 0.5 MG/1
TABLET ORAL
Qty: 20 TABLET | Refills: 0 | Status: CANCELLED | OUTPATIENT
Start: 2019-11-12

## 2019-11-13 RX ORDER — LORAZEPAM 0.5 MG/1
TABLET ORAL
Qty: 20 TABLET | Refills: 0 | Status: SHIPPED | OUTPATIENT
Start: 2019-11-13 | End: 2020-02-14

## 2019-11-13 NOTE — TELEPHONE ENCOUNTER
Controlled Substance Refill Request for     LORazepam (ATIVAN) 0.5 MG tablet 20 tablet 0 9/22/2019       Problem List Complete:  Yes    Last Written Prescription Date:  9/22/2019  Last Fill Quantity: 20,   # refills: 0    THE MOST RECENT OFFICE VISIT MUST BE WITHIN THE PAST 3 MONTHS. AT LEAST ONE FACE TO FACE VISIT MUST OCCUR EVERY 6 MONTHS. ADDITIONAL VISITS CAN BE VIRTUAL.  (THIS STATEMENT SHOULD BE DELETED.)    Last Office Visit with [facility] primary care provider: 3/19/2019    Future Office visit: Unknown      Controlled substance agreement:   Encounter-Level CSA:    There are no encounter-level csa.     Patient-Level CSA:    There are no patient-level csa.         Last Urine Drug Screen: No results found for: CDAUT, No results found for: COMDAT, No results found for: THC13, PCP13, COC13, MAMP13, OPI13, AMP13, BZO13, TCA13, MTD13, BAR13, OXY13, PPX13, BUP13     Processing:  Rx to be electronically transmitted to pharmacy by provider     [email or web address]   checked in past 3 months?  Yes - 9/20/2019

## 2020-02-13 DIAGNOSIS — F41.8 DEPRESSION WITH ANXIETY: ICD-10-CM

## 2020-02-13 DIAGNOSIS — F32.A ANXIETY AND DEPRESSION: ICD-10-CM

## 2020-02-13 DIAGNOSIS — F41.9 ANXIETY AND DEPRESSION: ICD-10-CM

## 2020-02-14 PROBLEM — F32.A ANXIETY AND DEPRESSION: Status: ACTIVE | Noted: 2018-09-20

## 2020-02-14 PROBLEM — F41.9 ANXIETY AND DEPRESSION: Status: ACTIVE | Noted: 2018-09-20

## 2020-02-14 RX ORDER — LORAZEPAM 0.5 MG/1
TABLET ORAL
Qty: 20 TABLET | Refills: 0 | Status: SHIPPED | OUTPATIENT
Start: 2020-02-14 | End: 2020-04-09

## 2020-02-14 NOTE — TELEPHONE ENCOUNTER
Lorazepam 0.5mg      Last Written Prescription Date:  11/13/19  Last Fill Quantity: 20,   # refills: 0  Last Office Visit: 3/19/19  Future Office visit:       Routing to [facility] to contact patient to inform due for appointment. Thank you.     Last [facility] website verification: 02/14/2020 [place]  [email or web address]      Routing refill request to provider for review/approval because:  Drug not on the [facility], [facility] or [facility] refill protocol or controlled substance    Daphne LAIRD RN

## 2020-04-09 ENCOUNTER — VIRTUAL VISIT (OUTPATIENT)
Dept: FAMILY MEDICINE | Facility: CLINIC | Age: 40
End: 2020-04-09
Payer: COMMERCIAL

## 2020-04-09 DIAGNOSIS — F43.23 ADJUSTMENT DISORDER WITH MIXED ANXIETY AND DEPRESSED MOOD: Primary | ICD-10-CM

## 2020-04-09 DIAGNOSIS — F41.8 DEPRESSION WITH ANXIETY: ICD-10-CM

## 2020-04-09 DIAGNOSIS — F41.9 ANXIETY AND DEPRESSION: ICD-10-CM

## 2020-04-09 DIAGNOSIS — F32.A ANXIETY AND DEPRESSION: ICD-10-CM

## 2020-04-09 DIAGNOSIS — F43.23 ADJUSTMENT DISORDER WITH MIXED ANXIETY AND DEPRESSED MOOD: ICD-10-CM

## 2020-04-09 PROCEDURE — 96127 BRIEF EMOTIONAL/BEHAV ASSMT: CPT | Mod: TEL | Performed by: INTERNAL MEDICINE

## 2020-04-09 PROCEDURE — 99214 OFFICE O/P EST MOD 30 MIN: CPT | Mod: TEL | Performed by: INTERNAL MEDICINE

## 2020-04-09 RX ORDER — CITALOPRAM HYDROBROMIDE 20 MG/1
TABLET ORAL
Qty: 135 TABLET | Refills: 1 | Status: SHIPPED | OUTPATIENT
Start: 2020-04-09

## 2020-04-09 RX ORDER — CITALOPRAM HYDROBROMIDE 20 MG/1
TABLET ORAL
Qty: 135 TABLET | Refills: 3 | OUTPATIENT
Start: 2020-04-09

## 2020-04-09 RX ORDER — LORAZEPAM 0.5 MG/1
TABLET ORAL
Qty: 20 TABLET | Refills: 0 | Status: SHIPPED | OUTPATIENT
Start: 2020-04-09 | End: 2020-06-04

## 2020-04-09 RX ORDER — SPIRONOLACTONE 25 MG/1
25 TABLET ORAL 2 TIMES DAILY
Qty: 180 TABLET | Refills: 1 | Status: SHIPPED | OUTPATIENT
Start: 2020-04-09

## 2020-04-09 ASSESSMENT — PATIENT HEALTH QUESTIONNAIRE - PHQ9: SUM OF ALL RESPONSES TO PHQ QUESTIONS 1-9: 5

## 2020-04-09 NOTE — TELEPHONE ENCOUNTER
"  citalopram (CELEXA) 20 MG tablet  135 tablet  3  3/19/2019        Last Written Prescription Date:  03/19/2019  Last Fill Quantity: 135,  # refills: 3   Last office visit: 3/19/2019 with prescribing provider:     Future Office Visit:  Left message to pt return our call, needs to schedule a televisit with .      Requested Prescriptions   Pending Prescriptions Disp Refills     citalopram (CELEXA) 20 MG tablet [Pharmacy Med Name: CITALOPRAM 20MG TABLETS] 135 tablet 3     Sig: TAKE 1 AND 1/2 TABLETS BY MOUTH EVERY DAY       SSRIs Protocol Failed - 4/9/2020  3:25 AM        Failed - PHQ-9 score less than 5 in past 6 months     Please review last PHQ-9 score.           Failed - Recent (6 mo) or future (30 days) visit within the authorizing provider's specialty     Patient had office visit in the last 6 months or has a visit in the next 30 days with authorizing provider or within the authorizing provider's specialty.  See \"Patient Info\" tab in inbasket, or \"Choose Columns\" in Meds & Orders section of the refill encounter.            Passed - Medication is active on med list        Passed - Patient is age 18 or older        Passed - No active pregnancy on record        Passed - No positive pregnancy test in last 12 months             "

## 2020-04-09 NOTE — PROGRESS NOTES
"Subjective     Suzie Castillo is a 39 year old female who is being evaluated via a billable telephone visit.      The patient has been notified of following:     \"This telephone visit will be conducted via a call between you and your physician/provider. We have found that certain health care needs can be provided without the need for a physical exam.  This service lets us provide the care you need with a short phone conversation.  If a prescription is necessary we can send it directly to your pharmacy.  If lab work is needed we can place an order for that and you can then stop by our lab to have the test done at a later time.    Telephone visits are billed at different rates depending on your insurance coverage. During this emergency period, for some insurers they may be billed the same as an in-person visit.  Please reach out to your insurance provider with any questions.    If during the course of the call the physician/provider feels a telephone visit is not appropriate, you will not be charged for this service.\"    Patient has given verbal consent for Telephone visit?  Yes    How would you like to obtain your AVS? Mail a copy    Suzie Castillo complains of No chief complaint on file.  Review medications for anxiety and depression.  Patient states that since her last office visit she is been doing well is much better and her strength and her anxiety and depression are well controlled.  She does not feel depressed she participates and has good contact with family her fiancé and friends.  Her  social family much degenerative rebound support sounds good.  She denies any fatigue, irritability, problems of concentration and sleeps well.  She has rare incidences of anxiousness and has no panic attacks in the recent past.    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    ALLERGIES  Patient has no known allergies.               Current Outpatient Medications   Medication Sig " Dispense Refill     citalopram (CELEXA) 20 MG tablet Si I/2 tabs po qd 135 tablet 1     citalopram (CELEXA) 20 MG tablet Si/2 tabs po qd 135 tablet 3     LORazepam (ATIVAN) 0.5 MG tablet TAKE 1 TABLET BY MOUTH TWICE DAILY AS NEEDED FOR ANXIETY 20 tablet 0     Minocycline HCl (MINOCIN PO) Take by mouth 2 times daily       spironolactone (ALDACTONE) 25 MG tablet Take 1 tablet (25 mg) by mouth 2 times daily 180 tablet 1     No Known Allergies    Reviewed and updated as needed this visit by Provider         Review of Systems   ROS COMP: Constitutional, HEENT, cardiovascular, pulmonary, gi and gu systems are negative, except as otherwise noted.       Objective   Reported vitals:  There were no vitals taken for this visit.   healthy, alert and no distress  Psych: Alert and oriented times 3; coherent speech, normal   rate and volume, able to articulate logical thoughts, able   to abstract reason, no tangential thoughts, no hallucinations   or delusions  Her affect is nl.             Assessment/Plan:  1. Adjustment disorder with mixed anxiety and depressed mood    - spironolactone (ALDACTONE) 25 MG tablet; Take 1 tablet (25 mg) by mouth 2 times daily  Dispense: 180 tablet; Refill: 1    2. Depression with anxiety  Continue with same medications.  Her exercise activity and social support sounds good.  - LORazepam (ATIVAN) 0.5 MG tablet; TAKE 1 TABLET BY MOUTH TWICE DAILY AS NEEDED FOR ANXIETY  Dispense: 20 tablet; Refill: 0  - spironolactone (ALDACTONE) 25 MG tablet; Take 1 tablet (25 mg) by mouth 2 times daily  Dispense: 180 tablet; Refill: 1  - citalopram (CELEXA) 20 MG tablet; Si I/2 tabs po qd  Dispense: 135 tablet; Refill: 1    No follow-ups on file.    Return to clinic in 2 months  Phone call duration:  11 minutes

## 2020-06-04 ENCOUNTER — TELEPHONE (OUTPATIENT)
Dept: FAMILY MEDICINE | Facility: CLINIC | Age: 40
End: 2020-06-04

## 2020-06-04 DIAGNOSIS — F41.8 DEPRESSION WITH ANXIETY: ICD-10-CM

## 2020-06-04 RX ORDER — LORAZEPAM 0.5 MG/1
TABLET ORAL
Qty: 20 TABLET | Refills: 0 | Status: SHIPPED | OUTPATIENT
Start: 2020-06-04 | End: 2020-10-06

## 2020-06-04 NOTE — TELEPHONE ENCOUNTER
Reason for Call:  Medication or medication refill:    Do you use a [facility]?  Name of the pharmacy and phone number for the current request:     [facility]   [phone]      Name of the medication requested:   LORazepam (ATIVAN) 0.5 MG tablet  20 tablet       Other request: pt is currently out of the medication    Can we leave a detailed message on this number? YES    Phone number patient can be reached at: Home number on file [phone] (home)    Best Time: any    Call taken on 6/4/2020 at 3:55 PM by Erika Patel

## 2020-06-04 NOTE — TELEPHONE ENCOUNTER
Pending Prescriptions:                       Disp   Refills    LORazepam (ATIVAN) 0.5 MG tablet          20 tab*0            Sig: TAKE 1 TABLET BY MOUTH TWICE DAILY AS NEEDED FOR           ANXIETY    Last Written Prescription Date:  4/9/20  Last Fill Quantity: 20,  # refills: 0   Last office visit: 3/19/2019 with prescribing provider:  Glenda   Future Office Visit:

## 2020-09-10 ENCOUNTER — TELEPHONE (OUTPATIENT)
Dept: FAMILY MEDICINE | Facility: CLINIC | Age: 40
End: 2020-09-10

## 2020-09-10 NOTE — TELEPHONE ENCOUNTER
Panel Management Review      Patient has the following on her problem list:     Depression / Dysthymia review    Measure:  Needs PHQ-9 score of 4 or less during index window.  Administer PHQ-9 and if score is 5 or more, send encounter to provider for next steps.    5 - 7 month window range:     PHQ-9 SCORE 5/6/2019 5/10/2019 4/9/2020   PHQ-9 Total Score 9 8 5       If PHQ-9 recheck is 5 or more, route to provider for next steps.    Patient is due for:  PHQ9 and DAP      Composite cancer screening  Chart review shows that this patient is due/due soon for the following Pap Smear  Summary:    Patient is due/failing the following:   PAP, PHQ9 and PHYSICAL    Action needed:   Patient needs office visit for physical. and Patient needs to do PHQ9.    Type of outreach:    Sent [employer] message.    Questions for provider review:    None                                                                                                                                    Thais Anderson MA       Chart routed to  .

## 2020-10-03 DIAGNOSIS — F41.8 DEPRESSION WITH ANXIETY: ICD-10-CM

## 2020-10-03 NOTE — TELEPHONE ENCOUNTER
Refill request:    LORAZEPAM 0.5 MG TABLETS    Summary: TAKE 1 TABLET BY MOUTH TWICE DAILY AS NEEDED FOR ANXIETY, Disp-20 tablet,R-0, E-Prescribe   Start: 6/4/2020  Ord/Sold: 6/4/2020

## 2020-10-06 RX ORDER — LORAZEPAM 0.5 MG/1
TABLET ORAL
Qty: 20 TABLET | Refills: 0 | Status: SHIPPED | OUTPATIENT
Start: 2020-10-06 | End: 2020-11-23

## 2020-10-13 NOTE — PROGRESS NOTES
SUBJECTIVE:                                                    Suzie Castillo is a 36 year old female who presents to clinic today for the following health issues:    Anxiety Follow-Up    Status since last visit: improved    Other associated symptoms: trouble sleeping and daily panic attacks    Complicating factors:   Significant life event: No   Current substance abuse: None  Depression symptoms: Yes-    TWYLA-7 SCORE 5/23/2017 5/25/2017 6/7/2017   Total Score 18 17 14        GAD7     Mrs. Castillo presents to the clinic for follow up of anxiety. One month ago, she was 2/10 on a scale of well being and has improved to 4/10 with medication, therapy and yoga. She developed nausea following increasing her citalopram but symptoms have improved. Everyday, she continues to experience panic attacks with associated shortness of breath. However, patient will only use lorazepam when she wakes in the middle of the night with a panic attack, as she will not be able to fall back asleep. She notes she will experience night-time panic attacks once every three nights. During the day, she is able to wait out the symptoms and does not use       Amount of exercise or physical activity: 4-5 days/week for an average of 45-60 minutes    Problems taking medications regularly: No    Medication side effects: none    Diet: regular (no restrictions)      Problem list and histories reviewed & adjusted, as indicated.  Additional history: as documented    Current Outpatient Prescriptions   Medication Sig Dispense Refill     citalopram (CELEXA) 40 MG tablet Take 1/2 tablet (10 mg) for 1-2 weeks, then increase to 1 tablet orally daily 30 tablet 1     LORazepam (ATIVAN) 0.5 MG tablet Take 1-2 tablets (0.5-1 mg) by mouth 2 times daily as needed for anxiety 20 tablet 0     Minocycline HCl (MINOCIN PO) Take by mouth 2 times daily       SPIRONOLACTONE PO Take by mouth 2 times daily       [DISCONTINUED] citalopram (CELEXA) 20 MG tablet Take 1/2  "tablet (10 mg) for 1-2 weeks, then increase to 1 tablet orally daily 30 tablet 0     No Known Allergies    Reviewed and updated as needed this visit by clinical staff       Reviewed and updated as needed this visit by Provider         ROS:  Constitutional, HEENT, cardiovascular, pulmonary, gi and gu systems are negative, except as otherwise noted.    This document serves as a record of the services and decisions personally performed and made by Yusuf Doshi MD. It was created on his/her behalf by Nela Guzman, a trained medical scribe. The creation of this document is based the provider's statements to the medical scribe.  Scribe Nela Guzman 5:45 PM, June 13, 2017     OBJECTIVE:                                                    /66 (BP Location: Right arm, Patient Position: Chair, Cuff Size: Adult Large)  Pulse 63  Temp 98.6  F (37  C) (Oral)  Ht 1.702 m (5' 7\")  Wt 90.6 kg (199 lb 12.8 oz)  SpO2 97%  Breastfeeding? No  BMI 31.29 kg/m2  Body mass index is 31.29 kg/(m^2).  Neck was supple without adenopathy or thyromegaly her carotids were normal without bruits  Chest clear to auscultation and percussion  Cardiovascular S1 and S2 are physiologic without murmurs or gallops  Abdomen bowel sounds were normal.  There is no palpable mass or organomegaly  Extremities nontender without any edema  Pulses pedal pulses are as described otherwise his pulses are bilaterally symmetrical throughout without bruits  Skin without significant abnormality      ASSESSMENT/PLAN:                                                    1. Need for prophylactic vaccination with tetanus-diphtheria (TD)  Will reassess at next appointment    2. Adjustment disorder with mixed anxiety and depressed mood  Our goal is to improve feeling of well being over the next month. We anticipate medications will not be life long but will be temporary, over the next 9-12 months. We discussed proper taper of anti-anxiety and " anti-depressants medications. Follow up in one month    3. Elevated blood pressure reading without diagnosis of hypertension  Well controlled at today's appointment    4. Depression with anxiety  See above  - citalopram (CELEXA) 40 MG tablet; Take 1/2 tablet (10 mg) for 1-2 weeks, then increase to 1 tablet orally daily  Dispense: 30 tablet; Refill: 1    Yusuf Doshi MD  [facility]    The information in this document, created by the medical scribe for me, accurately reflects the services I personally performed and the decisions made by me. I have reviewed and approved this document for accuracy prior to leaving the patient care area.  Yusuf Doshi MD  5:32 PM, 06/13/17      None

## 2020-11-23 DIAGNOSIS — F41.8 DEPRESSION WITH ANXIETY: ICD-10-CM

## 2020-11-23 NOTE — TELEPHONE ENCOUNTER
check needed  Patient overdue for office visit (LOV 4-9-2020 virtual, follow up 2 months, not done)  Does not appear that patient uses her [facility]  Pharm note given    Controlled Substance Refill Request for Lorazepam  Problem List Complete:  No     PROVIDER TO CONSIDER COMPLETION OF PROBLEM LIST AND OVERVIEW/CONTROLLED SUBSTANCE AGREEMENT    Last Written Prescription Date:  10-6-2020  Last Fill Quantity: 20,   # refills: 0      Last Office Visit with [facility] primary care provider: 4-9-2020 Glenda    [facility] Office visit: None    Controlled substance agreement:   Encounter-Level CSA:    There are no encounter-level csa.     Patient-Level CSA:    There are no patient-level csa.         Last Urine Drug Screen: No results found for: CDAUT, No results found for: COMDAT, No results found for: THC13, PCP13, COC13, MAMP13, OPI13, AMP13, BZO13, TCA13, MTD13, BAR13, OXY13, PPX13, BUP13     Processing:  Rx to be electronically transmitted to pharmacy by provider      [email or web address]       checked in past 3 months?  No, route to PAM Méndez, RT (R)

## 2020-11-27 RX ORDER — LORAZEPAM 0.5 MG/1
TABLET ORAL
Qty: 20 TABLET | Refills: 0 | Status: SHIPPED | OUTPATIENT
Start: 2020-11-27 | End: 2021-01-28

## 2021-01-15 ENCOUNTER — HEALTH MAINTENANCE LETTER (OUTPATIENT)
Age: 41
End: 2021-01-15

## 2021-01-27 DIAGNOSIS — F41.8 DEPRESSION WITH ANXIETY: ICD-10-CM

## 2021-01-28 RX ORDER — LORAZEPAM 0.5 MG/1
TABLET ORAL
Qty: 30 TABLET | Refills: 1 | Status: SHIPPED | OUTPATIENT
Start: 2021-01-28

## 2021-03-01 ENCOUNTER — FCC EXTENDED DOCUMENTATION (OUTPATIENT)
Dept: PSYCHOLOGY | Facility: CLINIC | Age: 41
End: 2021-03-01
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
## 2021-08-03 ENCOUNTER — MYC MEDICAL ADVICE (OUTPATIENT)
Dept: FAMILY MEDICINE | Facility: CLINIC | Age: 41
End: 2021-08-03

## 2021-10-24 ENCOUNTER — HEALTH MAINTENANCE LETTER (OUTPATIENT)
Age: 41
End: 2021-10-24

## 2022-02-13 ENCOUNTER — HEALTH MAINTENANCE LETTER (OUTPATIENT)
Age: 42
End: 2022-02-13

## 2022-10-16 ENCOUNTER — HEALTH MAINTENANCE LETTER (OUTPATIENT)
Age: 42
End: 2022-10-16

## 2023-03-26 ENCOUNTER — HEALTH MAINTENANCE LETTER (OUTPATIENT)
Age: 43
End: 2023-03-26

## 2024-03-23 ENCOUNTER — HEALTH MAINTENANCE LETTER (OUTPATIENT)
Age: 44
End: 2024-03-23

## 2024-06-01 ENCOUNTER — HEALTH MAINTENANCE LETTER (OUTPATIENT)
Age: 44
End: 2024-06-01